# Patient Record
Sex: FEMALE | Race: WHITE
[De-identification: names, ages, dates, MRNs, and addresses within clinical notes are randomized per-mention and may not be internally consistent; named-entity substitution may affect disease eponyms.]

---

## 2017-02-22 ENCOUNTER — HOSPITAL ENCOUNTER (OUTPATIENT)
Dept: HOSPITAL 47 - RADCTMAIN | Age: 32
Discharge: HOME | End: 2017-02-22
Payer: COMMERCIAL

## 2017-02-22 DIAGNOSIS — R06.02: Primary | ICD-10-CM

## 2017-02-22 DIAGNOSIS — R05: ICD-10-CM

## 2017-02-22 PROCEDURE — 71250 CT THORAX DX C-: CPT

## 2017-02-22 NOTE — CT
EXAMINATION TYPE: CT chest wo con

 

DATE OF EXAM: 2/22/2017 12:20 PM

 

COMPARISON: Previous study dated 11/3/2016

 

TECHNIQUE: Helical acquisition through the chest and upper abdomen was obtained without intravenous c
ontrast. The data was reformatted into axial, coronal and sagittal projections.

 

HISTORY: Cough.  SOB

 

CT DLP: 714 mGycm

Automated exposure control for dose reduction was used.

 

FINDINGS: 

There has been a lower cervical fusion.

 

The lungs are clear.

 

There is no significant axillary, internal mammary, mediastinal or hilar adenopathy. There is no pleu
ral or pericardial fluid. The heart is not enlarged.

 

Visualized portions of the upper abdomen are unremarkable.

 

No osseous lesion is seen.

 

IMPRESSION: 

 

NORMAL CT SCAN OF

## 2018-02-01 ENCOUNTER — HOSPITAL ENCOUNTER (OUTPATIENT)
Dept: HOSPITAL 47 - RADCTMAIN | Age: 33
Discharge: HOME | End: 2018-02-01
Payer: MEDICARE

## 2018-02-01 DIAGNOSIS — Z72.0: ICD-10-CM

## 2018-02-01 DIAGNOSIS — R05: ICD-10-CM

## 2018-02-01 DIAGNOSIS — R06.02: ICD-10-CM

## 2018-02-01 DIAGNOSIS — J98.09: Primary | ICD-10-CM

## 2018-02-01 DIAGNOSIS — K44.9: ICD-10-CM

## 2018-02-01 PROCEDURE — 80323 ALKALOIDS NOS: CPT

## 2018-02-01 PROCEDURE — 71250 CT THORAX DX C-: CPT

## 2018-02-01 NOTE — CT
EXAMINATION TYPE: CT chest wo con

 

DATE OF EXAM: 2/1/2018

 

COMPARISON: 2/22/2017

 

HISTORY: 32-year-old female Shortness of Breath

 

TECHNIQUE: Contiguous axial scanning of the chest without IV contrast. Coronal and sagittal reconstru
ctions performed.

 

CT DLP: 609.6 mGycm

Automated exposure control for dose reduction was used.

 

 

FINDINGS: 

Heart is normal size without pericardial effusion.

 

Aorta normal caliber with conventional arch vessel branching anatomy.

 

No thoracic lymphadenopathy seen.

 

Evaluation of the lungs demonstrates very minimal scattered bronchial wall thickening. No consolidati
on or pleural effusion. Minimal stranding inferior lingular atelectasis.

 

A tiny 2 mm peripheral right lower lobe pulmonary nodule axial image 32 is unchanged from 2/22/2017 s
uggesting a benign etiology. 

A 3 mm subpleural pulmonary nodule peripheral left base axial image 46 is similarly unchanged. 

 

Tiny hiatal hernia. Tiny inferior splenule.

 

Bones: No osseous destructive process. Partially visualized intervertebral disc prosthesis lower cerv
ical spine.

 

 

 

IMPRESSION: 

 

1. VERY MINIMAL SCATTERED BRONCHIAL WALL THICKENING COULD REPRESENT BRONCHITIS OR ASTHMA.

2. OTHERWISE, NO SPECIFIC ABNORMALITY SEEN.

3. TINY HIATAL HERNIA.

## 2018-02-12 ENCOUNTER — HOSPITAL ENCOUNTER (OUTPATIENT)
Dept: HOSPITAL 47 - RADECHMAIN | Age: 33
Discharge: HOME | End: 2018-02-12
Payer: COMMERCIAL

## 2018-02-12 DIAGNOSIS — I08.1: Primary | ICD-10-CM

## 2018-02-12 PROCEDURE — 93306 TTE W/DOPPLER COMPLETE: CPT

## 2018-02-13 NOTE — ECHOF
Referral Reason:R06.02 shortness of Breath



MEASUREMENTS

--------

HEIGHT: 170.2 cm

WEIGHT: 102.1 kg

BP: 141/84

RVIDd:   2.8 cm     (< 3.3)

IVSd:   1.2 cm     (0.6 - 1.1)

LVIDd:   4.4 cm     (3.9 - 5.3)

LVPWd:   1.2 cm     (0.6 - 1.1)

IVSs:   1.6 cm

LVIDs:   2.3 cm

LVPWs:   1.6 cm

LAESV Index (A-L):   11.62 ml/m

Ao Diam:   2.7 cm     (2.0 - 3.7)

AV Cusp:   1.7 cm     (1.5 - 2.6)

LA Diam:   3.4 cm     (2.7 - 3.8)

MV EXCURSION:   14.317 mm     (> 18.000)

MV EF SLOPE:   97 mm/s     (70 - 150)

EPSS:   0.6 cm

MV E Dwain:   0.75 m/s

MV DecT:   249 ms

MV A Dwain:   1.05 m/s

MV E/A Ratio:   0.72 

AV maxP.59 mmHg

AV meanP.81 mmHg

RAP:   5.00 mmHg

RVSP:   13.89 mmHg







FINDINGS

--------

Sinus rhythm.

This was a technically adequate study.

The left ventricular size is normal.   There is mild concentric left ventricular hypertrophy.   Overa
ll left ventricular systolic function is normal with, an EF between 55 - 60 %.

The right ventricle is normal in size and function.

Normal LA  size by volume 22+/-6 ml/m2.

The right atrium is normal in size.

The aortic valve is trileaflet, and appears structurally normal. No aortic stenosis or regurgitation.


The mitral valve is normal.   There is trace mitral regurgitation.

Trace tricuspid regurgitation present.   Right ventricular systolic pressure is normal at < 35 mmHg. 
  There is no evidence of pulmonary hypertension.

Trace/mild (physiologic)  pulmonic regurgitation.

The aortic root size is normal.

Normal inferior vena cava with normal inspiratory collapse consistent with estimated right atrial pre
ssure of  5 mmHg.

There is no pericardial effusion.



CONCLUSIONS

--------

1. Sinus rhythm.

2. This was a technically adequate study.

3. The left ventricular size is normal.

4. There is mild concentric left ventricular hypertrophy.

5. Overall left ventricular systolic function is normal with, an EF between 55 - 60 %.

6. Normal LA size by volume 22+/-6 ml/m2.

7. The aortic valve is trileaflet, and appears structurally normal. No aortic stenosis or regurgitati
on.

8. There is trace mitral regurgitation.

9. Trace tricuspid regurgitation present.

10. Right ventricular systolic pressure is normal at < 35 mmHg.

11. Trace/mild (physiologic)  pulmonic regurgitation.

12. The aortic root size is normal.

13. There is no pericardial effusion.





SONOGRAPHER: Pat Ha RDCS

## 2018-06-05 ENCOUNTER — HOSPITAL ENCOUNTER (OUTPATIENT)
Dept: HOSPITAL 47 - RADUSWWP | Age: 33
Discharge: HOME | End: 2018-06-05
Payer: MEDICARE

## 2018-06-05 DIAGNOSIS — R16.0: ICD-10-CM

## 2018-06-05 DIAGNOSIS — K42.9: Primary | ICD-10-CM

## 2018-06-05 PROCEDURE — 76700 US EXAM ABDOM COMPLETE: CPT

## 2018-06-05 NOTE — US
EXAMINATION TYPE: US abdomen complete

 

DATE OF EXAM: 6/5/2018

 

COMPARISON: CT Chest dated February 1, 2018

 

CLINICAL HISTORY: K42.9 Umbilical hernia w/o obstruction. Pt states pain at umbilicus

 

EXAM MEASUREMENTS:

 

Liver Length:  20.7 cm   

Gallbladder Wall:  0.2 cm   

CBD:  0.3 cm

Spleen:  10.9 cm   

Right Kidney:  11.6 X 4.2 X 5.2 cm 

Left Kidney:  12.0 X 4.2 X 5.5 cm   

 

 

 

Pancreas:  wnl, tail obscured by overlying bowel gas

Liver:  Enlarged, difficult to penetrate, heterogeneous  

Gallbladder:  wnl

**Evidence for sonographic Marie's sign:  No

CBD:  wnl 

Spleen:  wnl   

Right Kidney:  wnl   

Left Kidney:  wnl   

Upper IVC:  wnl  

Abd Aorta:  wnl

 

**At umbilicus, area of pt's pain: unable to appreciate abnormality**

The intrahepatic portion of the IVC and proximal abdominal aorta are within normal limits.  There is 
no evidence of cholelithiasis.  Common bile duct is unremarkable.  The visualized portions of the pan
creas are homogenous.  The spleen is unremarkable. Questionable cortical defect in the midpole the le
ft kidney.

 

IMPRESSION: Correlate for hepatocellular disease, hepatic steatosis, there is hepatomegaly. An umbili
peña hernia that contains fat was present on prior CT.

## 2018-06-28 ENCOUNTER — HOSPITAL ENCOUNTER (OUTPATIENT)
Dept: HOSPITAL 47 - ORWHC2ENDO | Age: 33
Discharge: HOME | End: 2018-06-28
Payer: COMMERCIAL

## 2018-06-28 VITALS — HEART RATE: 69 BPM | RESPIRATION RATE: 18 BRPM

## 2018-06-28 VITALS — SYSTOLIC BLOOD PRESSURE: 120 MMHG | DIASTOLIC BLOOD PRESSURE: 86 MMHG

## 2018-06-28 VITALS — TEMPERATURE: 97.7 F

## 2018-06-28 DIAGNOSIS — K29.50: ICD-10-CM

## 2018-06-28 DIAGNOSIS — F39: ICD-10-CM

## 2018-06-28 DIAGNOSIS — J45.909: ICD-10-CM

## 2018-06-28 DIAGNOSIS — Z91.040: ICD-10-CM

## 2018-06-28 DIAGNOSIS — K44.9: ICD-10-CM

## 2018-06-28 DIAGNOSIS — Z79.899: ICD-10-CM

## 2018-06-28 DIAGNOSIS — Z79.51: ICD-10-CM

## 2018-06-28 DIAGNOSIS — Z88.8: ICD-10-CM

## 2018-06-28 DIAGNOSIS — K21.0: Primary | ICD-10-CM

## 2018-06-28 PROCEDURE — 81025 URINE PREGNANCY TEST: CPT

## 2018-06-28 PROCEDURE — 43239 EGD BIOPSY SINGLE/MULTIPLE: CPT

## 2018-06-28 PROCEDURE — 88305 TISSUE EXAM BY PATHOLOGIST: CPT
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
## 2018-06-28 NOTE — P.PCN
Date of Procedure: 06/28/18


Description of Procedure: 





PREOPERATIVE DIAGNOSIS:


Gastroesophageal reflux disease.





POSTOPERATIVE DIAGNOSIS:


Gastritis.


Gastroesophageal reflux disease.


Diaphragmatic hiatal hernia without obstruction.





OPERATION:


Esophagogastroduodenoscopy with biopsies along antrum.





SURGEON: Enma Zhu MD





ANESTHESIA: MAC.





INDICATIONS:


The patient is a 32-year-old female who presents with a history of reflux 

disease. Benefits and risks of the procedure were described. Informed consent 

was obtained.





DESCRIPTION:


The patient was brought into the endoscopy suite and laid in the left lateral 

decubitus position. An Olympus gastroscope was passed along the posterior 

oropharynx down to the distal esophagus where the squamocolumnar junction was 

encountered at 37 cm from the incisors. The stomach was entered and no bile 

reflux was found.  Additional findings are listed below. Biopsies with cold 

forceps were obtained of the antrum. The first through third portion of the 

duodenum was examined and unremarkable. Retroflexion of the scope confirmed  

Hill grade 4 lower esophageal valve. The squamocolumnar junction demostrated LA 

grade A erosive esophagitis. The stomach was desufflated. The patient tolerated 

the procedure well.





FINDINGS:


Squamocolumnar junction 37 cm from the incisors.


Diaphragmatic hiatus at 40 cm.


Hiatal hernia 3 cm.


Hill grade 4 lower esophageal valve.


LA grade A erosive esophagitis.


No active duodenitis.


Early gastritis without bleeding along the antrum





RECOMMENDATIONS:


Upper endoscopy as needed.








Plan - Discharge Summary


New Discharge Prescriptions: 


No Action


   Albuterol Nebulized [Ventolin Nebulized] 2.5 mg INHALATION RT-Q6H PRN


     PRN Reason: Shortness Of Breath


   Colchicine 0.6 mg PO BID


   Albuterol Inhaler [Ventolin Hfa Inhaler] 2 puff INHALATION RT-Q4H PRN


     PRN Reason: Shortness Of Breath


   Diazepam [Valium] 10 mg PO TID PRN


     PRN Reason: Anxiety


   Ibuprofen [Motrin] 800 mg PO TID


   Hydroxychloroquine Sulfate [Plaquenil] 400 mg PO ONCE


Discharge Medication List





Albuterol Inhaler [Ventolin Hfa Inhaler] 2 puff INHALATION RT-Q4H PRN 11/03/16 [

History]


Albuterol Nebulized [Ventolin Nebulized] 2.5 mg INHALATION RT-Q6H PRN 11/03/16 [

History]


Colchicine 0.6 mg PO BID 11/03/16 [History]


Diazepam [Valium] 10 mg PO TID PRN 11/03/16 [History]


Hydroxychloroquine Sulfate [Plaquenil] 400 mg PO ONCE 06/28/18 [History]


Ibuprofen [Motrin] 800 mg PO TID 06/28/18 [History]








Patient Instructions/Handouts:  *[facility] - (Anesthesia) Endoscopy Discharge 

Instructions, Hiatal Hernia (DC), Gastritis (DC), Gastroesophageal Reflux 

Disease (DC), Upper Endoscopy (DC)


Activity/Diet/Wound Care/Special Instructions: 


REST TODAY, NO DRIVING, DRINK LOTS OF FLUIDS


DR TO CALL YOU WHEN DONE LATER TODAY OR TOMORROW


DR'S OFFICE TO CALL WITH RESULTS IN 5-7 DAYS


Discharge Disposition: HOME SELF-CARE

## 2018-08-09 ENCOUNTER — HOSPITAL ENCOUNTER (INPATIENT)
Dept: HOSPITAL 47 - OR | Age: 33
LOS: 3 days | Discharge: HOME | DRG: 208 | End: 2018-08-12
Payer: MEDICARE

## 2018-08-09 VITALS — BODY MASS INDEX: 39.4 KG/M2

## 2018-08-09 DIAGNOSIS — R49.0: ICD-10-CM

## 2018-08-09 DIAGNOSIS — K43.9: ICD-10-CM

## 2018-08-09 DIAGNOSIS — J45.909: ICD-10-CM

## 2018-08-09 DIAGNOSIS — Z88.7: ICD-10-CM

## 2018-08-09 DIAGNOSIS — Z91.040: ICD-10-CM

## 2018-08-09 DIAGNOSIS — M06.9: ICD-10-CM

## 2018-08-09 DIAGNOSIS — Z80.9: ICD-10-CM

## 2018-08-09 DIAGNOSIS — F41.9: ICD-10-CM

## 2018-08-09 DIAGNOSIS — R50.9: ICD-10-CM

## 2018-08-09 DIAGNOSIS — T88.4XXA: ICD-10-CM

## 2018-08-09 DIAGNOSIS — Z88.8: ICD-10-CM

## 2018-08-09 DIAGNOSIS — J98.01: ICD-10-CM

## 2018-08-09 DIAGNOSIS — Z79.899: ICD-10-CM

## 2018-08-09 DIAGNOSIS — Z82.49: ICD-10-CM

## 2018-08-09 DIAGNOSIS — Z53.9: ICD-10-CM

## 2018-08-09 DIAGNOSIS — E66.01: ICD-10-CM

## 2018-08-09 DIAGNOSIS — G89.29: ICD-10-CM

## 2018-08-09 DIAGNOSIS — Z83.49: ICD-10-CM

## 2018-08-09 DIAGNOSIS — R00.1: ICD-10-CM

## 2018-08-09 DIAGNOSIS — R13.10: ICD-10-CM

## 2018-08-09 DIAGNOSIS — R51: ICD-10-CM

## 2018-08-09 DIAGNOSIS — Z82.61: ICD-10-CM

## 2018-08-09 DIAGNOSIS — J96.01: Primary | ICD-10-CM

## 2018-08-09 DIAGNOSIS — F32.9: ICD-10-CM

## 2018-08-09 DIAGNOSIS — R41.3: ICD-10-CM

## 2018-08-09 DIAGNOSIS — M54.2: ICD-10-CM

## 2018-08-09 DIAGNOSIS — Z82.5: ICD-10-CM

## 2018-08-09 DIAGNOSIS — G93.1: ICD-10-CM

## 2018-08-09 LAB
ANION GAP SERPL CALC-SCNC: 9 MMOL/L
BASOPHILS # BLD AUTO: 0 K/UL (ref 0–0.2)
BASOPHILS NFR BLD AUTO: 0 %
BUN SERPL-SCNC: 11 MG/DL (ref 7–17)
CALCIUM SPEC-MCNC: 8.6 MG/DL (ref 8.4–10.2)
CHLORIDE SERPL-SCNC: 107 MMOL/L (ref 98–107)
CO2 BLDA-SCNC: 25 MMOL/L (ref 19–24)
CO2 SERPL-SCNC: 23 MMOL/L (ref 22–30)
EOSINOPHIL # BLD AUTO: 0.1 K/UL (ref 0–0.7)
EOSINOPHIL NFR BLD AUTO: 1 %
ERYTHROCYTE [DISTWIDTH] IN BLOOD BY AUTOMATED COUNT: 4.84 M/UL (ref 3.8–5.4)
ERYTHROCYTE [DISTWIDTH] IN BLOOD: 14.1 % (ref 11.5–15.5)
GLUCOSE BLD-MCNC: 167 MG/DL (ref 75–99)
GLUCOSE SERPL-MCNC: 182 MG/DL (ref 74–99)
HCO3 BLDA-SCNC: 23 MMOL/L (ref 21–25)
HCT VFR BLD AUTO: 42 % (ref 34–46)
HGB BLD-MCNC: 13.8 GM/DL (ref 11.4–16)
LYMPHOCYTES # SPEC AUTO: 1.9 K/UL (ref 1–4.8)
LYMPHOCYTES NFR SPEC AUTO: 12 %
MAGNESIUM SPEC-SCNC: 2 MG/DL (ref 1.6–2.3)
MCH RBC QN AUTO: 28.4 PG (ref 25–35)
MCHC RBC AUTO-ENTMCNC: 32.8 G/DL (ref 31–37)
MCV RBC AUTO: 86.7 FL (ref 80–100)
MONOCYTES # BLD AUTO: 0.2 K/UL (ref 0–1)
MONOCYTES NFR BLD AUTO: 1 %
NEUTROPHILS # BLD AUTO: 13.6 K/UL (ref 1.3–7.7)
NEUTROPHILS NFR BLD AUTO: 86 %
PCO2 BLDA: 46 MMHG (ref 35–45)
PH BLDA: 7.31 [PH] (ref 7.35–7.45)
PLATELET # BLD AUTO: 323 K/UL (ref 150–450)
PO2 BLDA: 385 MMHG (ref 83–108)
POTASSIUM SERPL-SCNC: 4.4 MMOL/L (ref 3.5–5.1)
SODIUM SERPL-SCNC: 139 MMOL/L (ref 137–145)
WBC # BLD AUTO: 15.9 K/UL (ref 3.8–10.6)

## 2018-08-09 PROCEDURE — 0BH17EZ INSERTION OF ENDOTRACHEAL AIRWAY INTO TRACHEA, VIA NATURAL OR ARTIFICIAL OPENING: ICD-10-PCS

## 2018-08-09 PROCEDURE — 81001 URINALYSIS AUTO W/SCOPE: CPT

## 2018-08-09 PROCEDURE — 85025 COMPLETE CBC W/AUTO DIFF WBC: CPT

## 2018-08-09 PROCEDURE — 86900 BLOOD TYPING SEROLOGIC ABO: CPT

## 2018-08-09 PROCEDURE — 86850 RBC ANTIBODY SCREEN: CPT

## 2018-08-09 PROCEDURE — 70551 MRI BRAIN STEM W/O DYE: CPT

## 2018-08-09 PROCEDURE — 82805 BLOOD GASES W/O2 SATURATION: CPT

## 2018-08-09 PROCEDURE — 36600 WITHDRAWAL OF ARTERIAL BLOOD: CPT

## 2018-08-09 PROCEDURE — 84100 ASSAY OF PHOSPHORUS: CPT

## 2018-08-09 PROCEDURE — 95816 EEG AWAKE AND DROWSY: CPT

## 2018-08-09 PROCEDURE — 87205 SMEAR GRAM STAIN: CPT

## 2018-08-09 PROCEDURE — 94640 AIRWAY INHALATION TREATMENT: CPT

## 2018-08-09 PROCEDURE — 83735 ASSAY OF MAGNESIUM: CPT

## 2018-08-09 PROCEDURE — 70450 CT HEAD/BRAIN W/O DYE: CPT

## 2018-08-09 PROCEDURE — 87070 CULTURE OTHR SPECIMN AEROBIC: CPT

## 2018-08-09 PROCEDURE — 36415 COLL VENOUS BLD VENIPUNCTURE: CPT

## 2018-08-09 PROCEDURE — 71045 X-RAY EXAM CHEST 1 VIEW: CPT

## 2018-08-09 PROCEDURE — 94003 VENT MGMT INPAT SUBQ DAY: CPT

## 2018-08-09 PROCEDURE — 86901 BLOOD TYPING SEROLOGIC RH(D): CPT

## 2018-08-09 PROCEDURE — 80048 BASIC METABOLIC PNL TOTAL CA: CPT

## 2018-08-09 PROCEDURE — 5A1935Z RESPIRATORY VENTILATION, LESS THAN 24 CONSECUTIVE HOURS: ICD-10-PCS

## 2018-08-09 RX ADMIN — MIDAZOLAM ONE MG: 1 INJECTION INTRAMUSCULAR; INTRAVENOUS at 14:25

## 2018-08-09 RX ADMIN — MIDAZOLAM PRN MG: 1 INJECTION INTRAMUSCULAR; INTRAVENOUS at 15:16

## 2018-08-09 RX ADMIN — MIDAZOLAM ONE MG: 1 INJECTION INTRAMUSCULAR; INTRAVENOUS at 14:07

## 2018-08-09 RX ADMIN — PROPOFOL SCH MLS/HR: 10 INJECTION, EMULSION INTRAVENOUS at 21:06

## 2018-08-09 RX ADMIN — METHYLPREDNISOLONE SODIUM SUCCINATE SCH MG: 125 INJECTION, POWDER, FOR SOLUTION INTRAMUSCULAR; INTRAVENOUS at 21:07

## 2018-08-09 RX ADMIN — IPRATROPIUM BROMIDE AND ALBUTEROL SULFATE SCH ML: .5; 3 SOLUTION RESPIRATORY (INHALATION) at 20:55

## 2018-08-09 RX ADMIN — PROPOFOL SCH MLS/HR: 10 INJECTION, EMULSION INTRAVENOUS at 18:00

## 2018-08-09 RX ADMIN — CEFAZOLIN SCH: 330 INJECTION, POWDER, FOR SOLUTION INTRAMUSCULAR; INTRAVENOUS at 17:35

## 2018-08-09 RX ADMIN — BUDESONIDE SCH MG: 1 SUSPENSION RESPIRATORY (INHALATION) at 20:55

## 2018-08-09 RX ADMIN — CEFAZOLIN SCH MLS/HR: 330 INJECTION, POWDER, FOR SOLUTION INTRAMUSCULAR; INTRAVENOUS at 21:09

## 2018-08-09 RX ADMIN — IPRATROPIUM BROMIDE AND ALBUTEROL SULFATE SCH ML: .5; 3 SOLUTION RESPIRATORY (INHALATION) at 23:44

## 2018-08-09 RX ADMIN — MIDAZOLAM PRN MG: 1 INJECTION INTRAMUSCULAR; INTRAVENOUS at 14:44

## 2018-08-09 RX ADMIN — FAMOTIDINE SCH MG: 10 INJECTION, SOLUTION INTRAVENOUS at 21:08

## 2018-08-09 RX ADMIN — MIDAZOLAM ONE MG: 1 INJECTION INTRAMUSCULAR; INTRAVENOUS at 14:03

## 2018-08-09 RX ADMIN — MIDAZOLAM ONE MG: 1 INJECTION INTRAMUSCULAR; INTRAVENOUS at 14:28

## 2018-08-09 RX ADMIN — IPRATROPIUM BROMIDE AND ALBUTEROL SULFATE SCH ML: .5; 3 SOLUTION RESPIRATORY (INHALATION) at 15:47

## 2018-08-09 RX ADMIN — PROPOFOL SCH: 10 INJECTION, EMULSION INTRAVENOUS at 17:35

## 2018-08-09 NOTE — XR
EXAMINATION TYPE: XR chest 1V

 

DATE OF EXAM: 8/9/2018

 

COMPARISON: 2/1/2018

 

HISTORY: Bronchospasm

 

TECHNIQUE: Single frontal view of the chest is obtained.

 

FINDINGS: Endotracheal tube extends into the right mainstem bronchus and should be retracted approxim
ately 3 to 3.5 cm for optimal placement. There are overall low lung volumes and right hemidiaphragm e
levation. Retrocardiac opacity is seen favored to represent atelectasis given the right mainstem bron
chial intubation. Enteric tube is appropriately placed with its fenestrated portion beyond the gastro
esophageal junction. Osseous structures are grossly intact.

 

IMPRESSION:  

1. Right mainstem bronchial intubation with recommendation of retraction of the endotracheal tube 3 t
o 3.5 cm and reimaging.

2. Retrocardiac opacity likely represents atelectasis given the above findings.

 

A Yellow level critical message alert has been initiated for Enma Zhu MD via the [employer] | Critical Results System on 8/9/2018 2:52 PM.  This message alert has been sent to Enma cornell MD via the preferences provided by the clinician for the receipt of Radiology Critical Findin
gs. Message ID [id number].

## 2018-08-09 NOTE — P.GSHP
History of Present Illness


H&P Date: 08/09/18








CHIEF COMPLAINT: Ventral hernia.





HISTORY OF PRESENT ILLNESS: The patient is a 32-year-old female who


presents with a history of swelling along the epigastrium above the umbilicus.


Findings were consistent with ventral hernia after diagnostic studies.


Now she presents for further evaluation and management.





PAST MEDICAL HISTORY: 


Please see list.





PAST SURGICAL HISTORY: 


Please see list.





MEDICATIONS: 


Please see list.





ALLERGIES:  Please see list. 





SOCIAL HISTORY: No illicit drug use





FAMILY HISTORY: No reports of Crohn disease or ulcerative colitis. 





REVIEW OF ORGAN SYSTEMS: 


CONSTITUTIONAL: No reports of fevers or chills. 


GI:  Denies any blood in stools or constipation. 





PHYSICAL EXAM: 


VITAL SIGNS:  Stable


GENERAL: Well-developed pleasant female in no acute distress. 


HEENT: No scleral icterus. Extraocular movements grossly


intact. Moist buccal mucosa. 


NECK: Supple without lymphadenopathy. 


CHEST: Unlabored respirations. Equal bilateral excursions. 


CARDIOVASCULAR: Regular rate and rhythm. Distal 2+ pulses. 


ABDOMEN: Soft, nondistended.  Protuberant.


MUSCULOSKELETAL: No clubbing, cyanosis, or edema. 





STUDIES:


Ultrasound of the abdomen consistent with incarcerated ventral hernia/umbilical 

hernia





ASSESSMENT: 


1.  Ventral hernia.


2.  Morbid obesity, BMI 35.6





PLAN: 


1.  Recommend proceeding with robotic ventral hernia repair.  Patient requests 

avoidance of mesh.


2.  Benefits and risks of surgical intervention was discussed including 

possibility of open technique.


3.  DVT prophylaxis.


4.  Antibiotic prophylaxis.





Past Medical History


Past Medical History: Asthma, Pneumonia, Rheumatoid Arthritis (RA)


Additional Past Medical History / Comment(s): chronic neck pain and headaches, 

being treated for PERICARDITIS, LUPUS like symptoms


History of Any Multi-Drug Resistant Organisms: None Reported


Past Surgical History: Ear Surgery, Tonsillectomy


Additional Past Surgical History / Comment(s): titanium plate neck sx, cosmetic 

sx as child on ears


Past Anesthesia/Blood Transfusion Reactions: No Reported Reaction


Smoking Status: Never smoker





- Past Family History


  ** Mother


Family Medical History: No Reported History





  ** Father


Family Medical History: Cancer


Additional Family Medical History / Comment(s): throat





Medications and Allergies


 Home Medications











 Medication  Instructions  Recorded  Confirmed  Type


 


Albuterol Inhaler [Ventolin Hfa 2 puff INHALATION RT-Q4H PRN 11/03/16 07/30/18 

History





Inhaler]    


 


Albuterol Nebulized [Ventolin 2.5 mg INHALATION RT-Q6H PRN 11/03/16 07/30/18 

History





Nebulized]    


 


Colchicine 0.6 mg PO BID 11/03/16 07/30/18 History


 


Hydroxychloroquine Sulfate 400 mg PO HS 06/28/18 07/30/18 History





[Plaquenil]    


 


Ibuprofen [Motrin] 800 mg PO TID 06/28/18 07/30/18 History


 


Omeprazole 20 mg PO DAILY #30 capsule. 06/28/18 07/30/18 Rx











 Allergies











Allergy/AdvReac Type Severity Reaction Status Date / Time


 


hepatitis B virus vaccine Allergy  Itching/swe Verified 07/30/18 15:18





   lling  


 


latex Allergy  Rash/Hives Verified 07/30/18 14:48


 


duloxetine [From Cymbalta] AdvReac  Hearing Verified 07/30/18 14:48





   Loss

## 2018-08-09 NOTE — CT
EXAMINATION TYPE: CT brain wo con

 

DATE OF EXAM: 8/9/2018

 

COMPARISON: 11/4/2014

 

HISTORY: Unresponsive

 

CT DLP: mGycm.  Automated Exposure Control for Dose Reduction was Utilized.

 

 

TECHNIQUE: CT scan of the head is performed without contrast.

 

Ventricles of normal size. There is no mass effect nor midline shift. There is no sign of intracrania
l hemorrhage. The calvarium is intact.

 

IMPRESSION:

Negative CT scan of the brain. No change.

## 2018-08-09 NOTE — P.CNPUL
History of Present Illness


Consult date: 08/09/18


Reason for consult: asthma


Chief complaint: Acute respiratory failure


History of present illness: 





32-year-old female patient with a difficult post intubation course.  The 

patient came in to undergo a ventral hernia repair.  The patient was induced by 

anesthesia.  The patient was given a combination of other milligrams of 

lidocaine, 50 mg of rocuronium, 2 mg of Versed and 50 g of fentanyl for 

induction.  Following that, the patient was intubated.  Immediately 

postintubation, and it was very difficult to bag.  There is to the volumes were 

low.  The patient progressively become hypoxic.  There was a drop in the pulse 

ox and there was no and bilateral CO2.  The patient was extremely 

bronchospastic and wheezy.  It was difficult to bag.  The patient was given 

epinephrine IV without much improvement in the airway pressures.  A total of 

100 g of epinephrine was given.  Subsequently the patient became bradycardic.  

Heart rate dropped in the mid 50s.  She was given a dose of atropine 1 mg.  She 

continued to drop her saturations on the day low 40s.  A fiberoptic scope was 

used to confirm due to positioning.  It was difficult to pass the ET tube to 

visualized airways.  The procedure was aborted.  Subsequently, the heart rate 

and the blood pressure stabilized and the tube positioning was confirmed again.

  The shins phosphorus came up in the mid 90s.  She was transferred to 

recovery.  I was asked to evaluate this patient in the recovery room





At the time of arrival at around 4:10 PM, the patient was sedated with Diprivan 

and she was very calm and comfortable.  She had received a total of 8 mg of 

Versed and 2 mg of Ativan.  She was successful the mechanical ventilator.  She 

is currently on assist control mode at the rate of 16, tidal volume of 450, 

FiO2 of 50% and PEEP of 5.  Her blood gases was done and patient showed a pH of 

7.31 with a pCO2 of 86 and pO2 of 385 and this was done and FiO2 of 100%.  Stat 

chest x-ray was ordered and the patient's ET tube was seen in the right 

mainstem bronchus.  The tube was pulled back by 2 cm and currently she is on 22 

cm lip line.  Rest on that equal and bilateral.  Peak airway pressure is 21.  

The patient is having equal and symmetrical breath sounds.  Minimal expiratory 

wheezing is noted.  She is hemodynamically stable.  Heart rate is sinus at the 

rate of 105.  Pulse ox is 95% on above-mentioned vent setting with an FiO2 of 40

%.  Obviously procedure was aborted.  No surgery was done.  A Dueñas catheter 

was inserted and the urine output was in order of 30 mL he had she is also 

receiving IV fluids and currently she is on 65 mL of lactated Ringer.  The 

preop blood work was all within normal and the patient had a normal renal 

function.  Patient is known to have bronchial asthma.  She also has history of 

lupus.  She has had previous history of pericarditis.  She has been maintained 

on O2 scene and Plaquenil on outpatient basis.  She is also Ventolin estimate 

basis.  No reported or established history of obstructive sleep apnea.  Note 

that the patient had a normal and uneventful intubation and the cords were 

seen.  The direct laryngoscopy.  A CAT scan of the chest that was done on 02/01/ 2018 showed no acute abnormalities identified and hiatal hernia.  No 

parenchymal abnormalities.  No mediastinal lymphadenopathy.  The mediastinal 

masses.





Review of Systems


ROS unobtainable: due to endotracheal tube





Past Medical History


Past Medical History: Asthma, Pneumonia, Rheumatoid Arthritis (RA)


Additional Past Medical History / Comment(s): Lupus, RA, pericarditis, chronic 

neck pain, chronic headaches,


History of Any Multi-Drug Resistant Organisms: None Reported


Past Surgical History: Ear Surgery, Tonsillectomy


Additional Past Surgical History / Comment(s): titanium plate neck sx, cosmetic 

sx as child on ears


Past Anesthesia/Blood Transfusion Reactions: No Reported Reaction


Smoking Status: Never smoker





- Past Family History


  ** Mother


Family Medical History: No Reported History





  ** Father


Family Medical History: Cancer


Additional Family Medical History / Comment(s): throat





Medications and Allergies


 Home Medications











 Medication  Instructions  Recorded  Confirmed  Type


 


Albuterol Inhaler [Ventolin Hfa 2 puff INHALATION RT-Q4H PRN 11/03/16 08/09/18 

History





Inhaler]    


 


Albuterol Nebulized [Ventolin 2.5 mg INHALATION RT-Q6H PRN 11/03/16 08/09/18 

History





Nebulized]    


 


Colchicine 0.6 mg PO BID 11/03/16 08/09/18 History


 


Hydroxychloroquine Sulfate 400 mg PO HS 06/28/18 08/09/18 History





[Plaquenil]    


 


Ibuprofen [Motrin] 600 mg PO TID 06/28/18 08/09/18 History


 


Omeprazole 20 mg PO DAILY 08/09/18 08/09/18 History











 Allergies











Allergy/AdvReac Type Severity Reaction Status Date / Time


 


hepatitis B virus vaccine Allergy  Itching/swe Verified 08/09/18 16:12





   lling  


 


latex Allergy  Rash/Hives Verified 08/09/18 16:12


 


duloxetine [From Cymbalta] AdvReac  Hearing Verified 08/09/18 16:12





   Loss  














Physical Exam


Vitals: 


 Vital Signs











  Temp Pulse Pulse Pulse Resp BP Pulse Ox


 


 08/09/18 15:59   109 H    26 H  


 


 08/09/18 15:56    123 H   16  131/72  96


 


 08/09/18 15:47   102 H    25 H  


 


 08/09/18 15:30    110 H   16  132/68  99


 


 08/09/18 15:00    116 H   16  135/61  97


 


 08/09/18 14:45    117 H   16  142/91  99


 


 08/09/18 14:30    104 H   16  117/57  99


 


 08/09/18 14:15    118 H   16  129/62  99


 


 08/09/18 14:00    100   16  107/63  94 L


 


 08/09/18 13:51  97.8 F   90   14  124/63  92 L


 


 08/09/18 11:12  98.3 F    74  16  116/79  97








 Intake and Output











 08/09/18 08/09/18 08/09/18





 06:59 14:59 22:59


 


Intake Total  200 


 


Balance  200 


 


Intake:   


 


  IV  200 














Intubated, comfortable on mechanical ventilator the patient is 7.5 tube in 

place.


Head exam was generally normal. There was no scleral icterus or corneal arcus. 

Mucous membranes were moist.


Neck was supple and without jugular venous distension, thyromegaly, or carotid 

bruits. Carotids were easily palpable bilaterally. There was no adenopathy.


Lungs other records symmetrical bilaterally along with some few scattered 

external wheeze


Cardiac exam revealed the PMI to be normally situated and sized. The rhythm was 

regular and no extrasystoles were noted during several minutes of auscultation. 

The first and second heart sounds were normal and physiologic splitting of the 

second heart sound was noted. There were no murmurs, rubs, clicks, or gallops.


Abdominal exam revealed normal bowel sounds. The abdomen was soft, non-tender, 

and without masses, organomegaly, or appreciable enlargement of the abdominal 

aorta.


Examination of the extremities revealed easily palpable radial, femoral and 

pedal pulses. There was no cyanosis, clubbing or edema.


Examination of the skin revealed no evidence of significant rashes, suspicious 

appearing nevi or other concerning lesions.


Neurologically the patient is sedated and the patient is currently in the 

process of being taken up to sedation.





Results





- Laboratory Findings


CBC and BMP: 


 08/09/18 14:08





 08/09/18 14:08


ABG











ABG pH  7.31  (7.35-7.45)  L  08/09/18  14:40    


 


ABG pCO2  46 mmHg (35-45)  H  08/09/18  14:40    


 


ABG pO2  385 mmHg ()  H  08/09/18  14:40    


 


ABG O2 Saturation  100.0 % (94-97)  H  08/09/18  14:40    








Abnormal lab findings: 


 Abnormal Labs











  08/09/18 08/09/18 08/09/18





  14:08 14:08 14:40


 


WBC  15.9 H  


 


Neutrophils #  13.6 H  


 


ABG pH    7.31 L


 


ABG pCO2    46 H


 


ABG pO2    385 H


 


ABG Total CO2    25 H


 


ABG O2 Saturation    100.0 H


 


Glucose   182 H 














- Diagnostic Findings


Chest x-ray: image reviewed





Assessment and Plan


Plan: 








Assessment





1 acute hypoxic respiratory failure.  The patient had difficulties with airway 

management and pulmonary management including oxygenation and ventilation post 

intubation.  Events reported by anesthesia was noted.  It's likely that the 

patient had a right mainstem intubation knowing that the chest x-rays was done 

in recovery show that the ET tube was in the right mainstem.  The tube is 

positioned appropriately at this point in time.  The ventilation is adequate.  

Oxidation is adequate.  The airway pressures are low.  Another possibility is 

that the patient had acute bronchospasm there was severe and it affected 

oxygenation and ventilation in general.  The patient received epinephrine.  No 

indication for a negative pressure pulmonary edema.  N0 indication for any 

airway obstruction at this point in time.  ET tube is in place.  I was able to 

pass and the suction tube without any major difficulties.  Breath sounds are 

equal and symmetrical.





2 acute hemodynamic instability secondary to hypoxemia, recovered.





3 obesity





4.   ventral hernia and the procedure was canceled due to the events mentioned 

above





5 history of lupus/RA





6 history of pericarditis





Plan





We'll put the patient on DuoNeb neb regiments of the clock.  IV Solu-Medrol 125 

mg now and 60 mg every 6 hours.  Pulmicort Respules twice a day.  Blood gases 

was reviewed.  ET tube positioning was done.  Wean off sedation.  Assessment 

patient.  Assessment weaning parameters.  Spontaneous breathing trial.  

Possible extubation today.  The patient will moved to the intensive care unit.  

Continue to follow.

## 2018-08-09 NOTE — P.PN
Progress Note - Text


Progress Note Date: 08/09/18





Immediately upon induction and intubation patient went into acute bronchospasm.

  Anesthesia team at bedside.  As result of acute bronchospasm prior to prep 

and drape for surgery, case is canceled.  Patient to be observed in the ICU.

## 2018-08-09 NOTE — P.PN
Progress Note - Text


Progress Note Date: 08/09/18





Updates to family performed including acute bronchospasm.





Discussion with anesthesia demonstrated tracheal stricture identified despite 

attempts of bronchoscopy.  Per patient's family request, ENT Dr. Leary 

requested.

## 2018-08-10 LAB
ANION GAP SERPL CALC-SCNC: 7 MMOL/L
BASOPHILS # BLD AUTO: 0 K/UL (ref 0–0.2)
BASOPHILS NFR BLD AUTO: 0 %
BUN SERPL-SCNC: 15 MG/DL (ref 7–17)
CALCIUM SPEC-MCNC: 8.8 MG/DL (ref 8.4–10.2)
CHLORIDE SERPL-SCNC: 106 MMOL/L (ref 98–107)
CO2 BLDA-SCNC: 26 MMOL/L (ref 19–24)
CO2 SERPL-SCNC: 25 MMOL/L (ref 22–30)
EOSINOPHIL # BLD AUTO: 0.1 K/UL (ref 0–0.7)
EOSINOPHIL NFR BLD AUTO: 1 %
ERYTHROCYTE [DISTWIDTH] IN BLOOD BY AUTOMATED COUNT: 4.69 M/UL (ref 3.8–5.4)
ERYTHROCYTE [DISTWIDTH] IN BLOOD: 14.3 % (ref 11.5–15.5)
GLUCOSE BLD-MCNC: 141 MG/DL (ref 75–99)
GLUCOSE BLD-MCNC: 151 MG/DL (ref 75–99)
GLUCOSE BLD-MCNC: 158 MG/DL (ref 75–99)
GLUCOSE BLD-MCNC: 188 MG/DL (ref 75–99)
GLUCOSE SERPL-MCNC: 193 MG/DL (ref 74–99)
HCO3 BLDA-SCNC: 25 MMOL/L (ref 21–25)
HCT VFR BLD AUTO: 40.5 % (ref 34–46)
HGB BLD-MCNC: 13.2 GM/DL (ref 11.4–16)
LYMPHOCYTES # SPEC AUTO: 1.1 K/UL (ref 1–4.8)
LYMPHOCYTES NFR SPEC AUTO: 6 %
MAGNESIUM SPEC-SCNC: 1.9 MG/DL (ref 1.6–2.3)
MCH RBC QN AUTO: 28.2 PG (ref 25–35)
MCHC RBC AUTO-ENTMCNC: 32.7 G/DL (ref 31–37)
MCV RBC AUTO: 86.3 FL (ref 80–100)
MONOCYTES # BLD AUTO: 0.3 K/UL (ref 0–1)
MONOCYTES NFR BLD AUTO: 2 %
NEUTROPHILS # BLD AUTO: 16.8 K/UL (ref 1.3–7.7)
NEUTROPHILS NFR BLD AUTO: 92 %
PCO2 BLDA: 40 MMHG (ref 35–45)
PH BLDA: 7.41 [PH] (ref 7.35–7.45)
PH UR: 6 [PH] (ref 5–8)
PLATELET # BLD AUTO: 317 K/UL (ref 150–450)
PO2 BLDA: 91 MMHG (ref 83–108)
POTASSIUM SERPL-SCNC: 4.2 MMOL/L (ref 3.5–5.1)
PROT UR QL: (no result)
RBC UR QL: >182 /HPF (ref 0–5)
SODIUM SERPL-SCNC: 138 MMOL/L (ref 137–145)
SP GR UR: 1.02 (ref 1–1.03)
SQUAMOUS UR QL AUTO: 1 /HPF (ref 0–4)
UROBILINOGEN UR QL STRIP: <2 MG/DL (ref ?–2)
WBC # BLD AUTO: 18.3 K/UL (ref 3.8–10.6)
WBC #/AREA URNS HPF: 44 /HPF (ref 0–5)

## 2018-08-10 RX ADMIN — PROPOFOL SCH MLS/HR: 10 INJECTION, EMULSION INTRAVENOUS at 01:00

## 2018-08-10 RX ADMIN — IPRATROPIUM BROMIDE AND ALBUTEROL SULFATE SCH ML: .5; 3 SOLUTION RESPIRATORY (INHALATION) at 20:17

## 2018-08-10 RX ADMIN — ENOXAPARIN SODIUM SCH MG: 40 INJECTION SUBCUTANEOUS at 09:36

## 2018-08-10 RX ADMIN — PROPOFOL SCH MLS/HR: 10 INJECTION, EMULSION INTRAVENOUS at 05:41

## 2018-08-10 RX ADMIN — METHYLPREDNISOLONE SODIUM SUCCINATE SCH MG: 125 INJECTION, POWDER, FOR SOLUTION INTRAMUSCULAR; INTRAVENOUS at 23:03

## 2018-08-10 RX ADMIN — METHYLPREDNISOLONE SODIUM SUCCINATE SCH MG: 125 INJECTION, POWDER, FOR SOLUTION INTRAMUSCULAR; INTRAVENOUS at 01:04

## 2018-08-10 RX ADMIN — FAMOTIDINE SCH MG: 10 INJECTION, SOLUTION INTRAVENOUS at 09:37

## 2018-08-10 RX ADMIN — IPRATROPIUM BROMIDE AND ALBUTEROL SULFATE SCH ML: .5; 3 SOLUTION RESPIRATORY (INHALATION) at 02:58

## 2018-08-10 RX ADMIN — IPRATROPIUM BROMIDE AND ALBUTEROL SULFATE SCH ML: .5; 3 SOLUTION RESPIRATORY (INHALATION) at 07:55

## 2018-08-10 RX ADMIN — METHYLPREDNISOLONE SODIUM SUCCINATE SCH MG: 125 INJECTION, POWDER, FOR SOLUTION INTRAMUSCULAR; INTRAVENOUS at 05:41

## 2018-08-10 RX ADMIN — CEFTRIAXONE SODIUM SCH MG: 1 INJECTION, POWDER, FOR SOLUTION INTRAMUSCULAR; INTRAVENOUS at 08:06

## 2018-08-10 RX ADMIN — CEFAZOLIN SCH MLS/HR: 330 INJECTION, POWDER, FOR SOLUTION INTRAMUSCULAR; INTRAVENOUS at 12:45

## 2018-08-10 RX ADMIN — IPRATROPIUM BROMIDE AND ALBUTEROL SULFATE SCH ML: .5; 3 SOLUTION RESPIRATORY (INHALATION) at 23:46

## 2018-08-10 RX ADMIN — MAGNESIUM SULFATE IN DEXTROSE SCH MLS/HR: 10 INJECTION, SOLUTION INTRAVENOUS at 09:37

## 2018-08-10 RX ADMIN — METHYLPREDNISOLONE SODIUM SUCCINATE SCH MG: 125 INJECTION, POWDER, FOR SOLUTION INTRAMUSCULAR; INTRAVENOUS at 18:46

## 2018-08-10 RX ADMIN — BUDESONIDE SCH MG: 1 SUSPENSION RESPIRATORY (INHALATION) at 07:55

## 2018-08-10 RX ADMIN — INSULIN ASPART SCH: 100 INJECTION, SOLUTION INTRAVENOUS; SUBCUTANEOUS at 20:06

## 2018-08-10 RX ADMIN — CEFAZOLIN SCH MLS/HR: 330 INJECTION, POWDER, FOR SOLUTION INTRAMUSCULAR; INTRAVENOUS at 20:18

## 2018-08-10 RX ADMIN — METHYLPREDNISOLONE SODIUM SUCCINATE SCH MG: 125 INJECTION, POWDER, FOR SOLUTION INTRAMUSCULAR; INTRAVENOUS at 12:38

## 2018-08-10 RX ADMIN — PROPOFOL SCH MLS/HR: 10 INJECTION, EMULSION INTRAVENOUS at 03:28

## 2018-08-10 RX ADMIN — IPRATROPIUM BROMIDE AND ALBUTEROL SULFATE SCH ML: .5; 3 SOLUTION RESPIRATORY (INHALATION) at 15:55

## 2018-08-10 RX ADMIN — FAMOTIDINE SCH MG: 10 INJECTION, SOLUTION INTRAVENOUS at 20:17

## 2018-08-10 RX ADMIN — BUDESONIDE SCH MG: 1 SUSPENSION RESPIRATORY (INHALATION) at 20:17

## 2018-08-10 RX ADMIN — CEFAZOLIN SCH MLS/HR: 330 INJECTION, POWDER, FOR SOLUTION INTRAMUSCULAR; INTRAVENOUS at 23:03

## 2018-08-10 RX ADMIN — MAGNESIUM SULFATE IN DEXTROSE SCH MLS/HR: 10 INJECTION, SOLUTION INTRAVENOUS at 08:07

## 2018-08-10 RX ADMIN — IPRATROPIUM BROMIDE AND ALBUTEROL SULFATE SCH ML: .5; 3 SOLUTION RESPIRATORY (INHALATION) at 11:50

## 2018-08-10 RX ADMIN — INSULIN ASPART SCH UNIT: 100 INJECTION, SOLUTION INTRAVENOUS; SUBCUTANEOUS at 06:54

## 2018-08-10 NOTE — P.PN
Subjective


Progress Note Date: 08/10/18








32-year-old female patient with a difficult post intubation course.  The 

patient came in to undergo a ventral hernia repair.  The patient was induced by 

anesthesia.  The patient was given a combination of other milligrams of 

lidocaine, 50 mg of rocuronium, 2 mg of Versed and 50 g of fentanyl for 

induction.  Following that, the patient was intubated.  Immediately 

postintubation, and it was very difficult to bag.  There is to the volumes were 

low.  The patient progressively become hypoxic.  There was a drop in the pulse 

ox and there was no and bilateral CO2.  The patient was extremely 

bronchospastic and wheezy.  It was difficult to bag.  The patient was given 

epinephrine IV without much improvement in the airway pressures.  A total of 

100 g of epinephrine was given.  Subsequently the patient became bradycardic.  

Heart rate dropped in the mid 50s.  She was given a dose of atropine 1 mg.  She 

continued to drop her saturations on the day low 40s.  A fiberoptic scope was 

used to confirm due to positioning.  It was difficult to pass the ET tube to 

visualized airways.  The procedure was aborted.  Subsequently, the heart rate 

and the blood pressure stabilized and the tube positioning was confirmed again.

  The shins phosphorus came up in the mid 90s.  She was transferred to 

recovery.  I was asked to evaluate this patient in the recovery room





At the time of arrival at around 4:10 PM, the patient was sedated with Diprivan 

and she was very calm and comfortable.  She had received a total of 8 mg of 

Versed and 2 mg of Ativan.  She was successful the mechanical ventilator.  She 

is currently on assist control mode at the rate of 16, tidal volume of 450, 

FiO2 of 50% and PEEP of 5.  Her blood gases was done and patient showed a pH of 

7.31 with a pCO2 of 86 and pO2 of 385 and this was done and FiO2 of 100%.  Stat 

chest x-ray was ordered and the patient's ET tube was seen in the right 

mainstem bronchus.  The tube was pulled back by 2 cm and currently she is on 22 

cm lip line.  Rest on that equal and bilateral.  Peak airway pressure is 21.  

The patient is having equal and symmetrical breath sounds.  Minimal expiratory 

wheezing is noted.  She is hemodynamically stable.  Heart rate is sinus at the 

rate of 105.  Pulse ox is 95% on above-mentioned vent setting with an FiO2 of 40

%.  Obviously procedure was aborted.  No surgery was done.  A Dueñas catheter 

was inserted and the urine output was in order of 30 mL he had she is also 

receiving IV fluids and currently she is on 65 mL of lactated Ringer.  The 

preop blood work was all within normal and the patient had a normal renal 

function.  Patient is known to have bronchial asthma.  She also has history of 

lupus.  She has had previous history of pericarditis.  She has been maintained 

on O2 scene and Plaquenil on outpatient basis.  She is also Ventolin estimate 

basis.  No reported or established history of obstructive sleep apnea.  Note 

that the patient had a normal and uneventful intubation and the cords were 

seen.  The direct laryngoscopy.  A CAT scan of the chest that was done on 02/01/ 2018 showed no acute abnormalities identified and hiatal hernia.  No 

parenchymal abnormalities.  No mediastinal lymphadenopathy.  The mediastinal 

masses.





On 08/10/2018 I'm seeing this patient for a follow-up.  The patient is 

currently in the intensive care unit.  However efforts to wean the patient off 

the mechanical ventilator failed yesterday.  As the patient was being weaned 

off the Diprivan, she was getting agitated and she never got to a point where 

she was able to follow commands or answer questions.  I suspected that there 

was some residual drug effect disturbing our weaning process.  The patient was 

not following any commands.  She was thrashing.  She was getting agitated.  As 

such, we decided to keep the patient on sedation.  She got moved to the 

intensive care unit.  Overnight she was kept on a mechanical ventilator and 

this morning she is on a combination of Diprivan and fentanyl for sedation.  

Diprivan earlier this morning was at 50 g per KG per minute.  The patient is 

also on no dose of fentanyl drip pH is very calm and comfortable.  She is 

synchronous with the mechanical ventilator and currently she is on assist 

control of 16, tidal volume of 450, FiO2 of 40% and a PEEP of 5.  The blood 

gases from this morning showed a pH of 7.41 with a pCO2 of 40.  PO2 of 91.  The 

peak airway pressure is 27.  As mentioned earlier the patient intubated by 7.5 

ET tube.  No difficulties suctioning her through the orotracheal tube.  Able to 

advanced to suction through the orotracheal tube without any major 

difficulties.  The chest x-ray from today shows no acute abnormalities.  Note 

that the patient overnight was having a low-grade fever with a temperature max 

of 100.9.  UA was abnormal and the patient was started on IV Rocephin.  She is 

hemodynamically stable.  She is producing adequate amount of urine output.  The 

white cell count this morning is at 18.3.  Rest of the blood work and 

electrodes are all within normal limits.  No major bronchospasm and wheezing.  

She is on bronchodilators.  She is on IV Solu-Medrol.  She is on Pulmicort 

Respules.  She is on DVT and GI prophylaxis.  CAT scan of the brain was done 

yesterday and it showed no acute abnormalities.





Objective





- Vital Signs


Vital signs: 


 Vital Signs











Temp  98.6 F   08/10/18 05:23


 


Pulse  96   08/10/18 07:58


 


Resp  16   08/10/18 07:00


 


BP  119/55   08/10/18 07:00


 


Pulse Ox  96   08/10/18 07:00








 Intake & Output











 08/09/18 08/10/18 08/10/18





 18:59 06:59 18:59


 


Intake Total 700 1559.150 66.907


 


Output Total 1500 1240 


 


Balance -800 319.150 66.907


 


Weight  115.5 kg 


 


Intake:   


 


   1200 


 


    Sodium Chloride 0.9% 1,  1200 





    000 ml @ 100 mls/hr IV .   





    Q10H VIKAS Rx#:[id number]   


 


  Intake, IV Titration  359.150 66.907





  Amount   


 


    Empty Bag 1 bag @ 10 MCG/  340.350 66.907





    KG/MIN 6.17 mls/hr IV .   





    [id number] VIKAS with Propofol   





    1,000 mg Rx#:[id number]   


 


    fentaNYL (PF) 2,500 mcg  18.8 





    In Sodium Chloride 0.9%   





    200 ml @ Per Protocol IV   





    .Q0M VIKAS Rx#:[id number]   


 


Output:   


 


  Urine 1500 1240 


 


Other:   


 


  Voiding Method Indwelling Catheter Indwelling Catheter 














- Exam








Intubated, comfortable on mechanical ventilator the patient is 7.5 tube in 

place.


Head exam was generally normal. There was no scleral icterus or corneal arcus. 

Mucous membranes were moist.


Neck was supple and without jugular venous distension, thyromegaly, or carotid 

bruits. Carotids were easily palpable bilaterally. There was no adenopathy.


Lungs other records symmetrical bilaterally along with some few scattered 

external wheeze


Cardiac exam revealed the PMI to be normally situated and sized. The rhythm was 

regular and no extrasystoles were noted during several minutes of auscultation. 

The first and second heart sounds were normal and physiologic splitting of the 

second heart sound was noted. There were no murmurs, rubs, clicks, or gallops.


Abdominal exam revealed normal bowel sounds. The abdomen was soft, non-tender, 

and without masses, organomegaly, or appreciable enlargement of the abdominal 

aorta.


Examination of the extremities revealed easily palpable radial, femoral and 

pedal pulses. There was no cyanosis, clubbing or edema.


Examination of the skin revealed no evidence of significant rashes, suspicious 

appearing nevi or other concerning lesions.


Neurologically the patient is sedated and the patient is currently in the 

process of being taken up to sedation again this morning.  The patient will be 

given a sedation holiday.  Her neurologic function will be assessed.  

Accordingly we'll decide on the weaning.  Pupils are equal and reactive to 

light.  No nystagmus.  No facial asymmetry.  She has a good cough and gag 

reflex.








- Labs


CBC & Chem 7: 


 08/10/18 04:36





 08/10/18 04:36


Labs: 


 Abnormal Lab Results - Last 24 Hours (Table)











  08/09/18 08/09/18 08/09/18 Range/Units





  14:08 14:08 14:40 


 


WBC  15.9 H    (3.8-10.6)  k/uL


 


Neutrophils #  13.6 H    (1.3-7.7)  k/uL


 


ABG pH    7.31 L  (7.35-7.45)  


 


ABG pCO2    46 H  (35-45)  mmHg


 


ABG pO2    385 H  ()  mmHg


 


ABG Total CO2    25 H  (19-24)  mmol/L


 


ABG O2 Saturation    100.0 H  (94-97)  %


 


Glucose   182 H   (74-99)  mg/dL


 


POC Glucose (mg/dL)     (75-99)  mg/dL


 


Urine Appearance     (Clear)  


 


Urine Protein     (Negative)  


 


Urine Ketones     (Negative)  


 


Urine Blood     (Negative)  


 


Ur Leukocyte Esterase     (Negative)  


 


Urine RBC     (0-5)  /hpf


 


Urine WBC     (0-5)  /hpf


 


Urine Mucus     (None)  /hpf














  08/09/18 08/10/18 08/10/18 Range/Units





  18:03 04:36 04:36 


 


WBC   18.3 H   (3.8-10.6)  k/uL


 


Neutrophils #   16.8 H   (1.3-7.7)  k/uL


 


ABG pH     (7.35-7.45)  


 


ABG pCO2     (35-45)  mmHg


 


ABG pO2     ()  mmHg


 


ABG Total CO2     (19-24)  mmol/L


 


ABG O2 Saturation     (94-97)  %


 


Glucose    193 H  (74-99)  mg/dL


 


POC Glucose (mg/dL)  167 H    (75-99)  mg/dL


 


Urine Appearance     (Clear)  


 


Urine Protein     (Negative)  


 


Urine Ketones     (Negative)  


 


Urine Blood     (Negative)  


 


Ur Leukocyte Esterase     (Negative)  


 


Urine RBC     (0-5)  /hpf


 


Urine WBC     (0-5)  /hpf


 


Urine Mucus     (None)  /hpf














  08/10/18 08/10/18 08/10/18 Range/Units





  05:20 06:20 06:30 


 


WBC     (3.8-10.6)  k/uL


 


Neutrophils #     (1.3-7.7)  k/uL


 


ABG pH     (7.35-7.45)  


 


ABG pCO2     (35-45)  mmHg


 


ABG pO2     ()  mmHg


 


ABG Total CO2  26 H    (19-24)  mmol/L


 


ABG O2 Saturation  97.7 H    (94-97)  %


 


Glucose     (74-99)  mg/dL


 


POC Glucose (mg/dL)   188 H   (75-99)  mg/dL


 


Urine Appearance    Cloudy H  (Clear)  


 


Urine Protein    1+ H  (Negative)  


 


Urine Ketones    Trace H  (Negative)  


 


Urine Blood    Large H  (Negative)  


 


Ur Leukocyte Esterase    Trace H  (Negative)  


 


Urine RBC    >182 H  (0-5)  /hpf


 


Urine WBC    44 H  (0-5)  /hpf


 


Urine Mucus    Rare H  (None)  /hpf








 Microbiology - Last 24 Hours (Table)











 08/09/18 20:22 Sputum Culture - Preliminary





 Sputum 














Assessment and Plan


Plan: 








Assessment





1 acute hypoxic respiratory failure.  The patient had difficulties with airway 

management and pulmonary management including oxygenation and ventilation post 

intubation.  Events reported by anesthesia was noted.  It's likely that the 

patient had a right mainstem intubation knowing that the chest x-rays was done 

in recovery show that the ET tube was in the right mainstem.  The tube is 

positioned appropriately at this point in time.  The ventilation is adequate.  

Oxidation is adequate.  The airway pressures are low.  Another possibility is 

that the patient had acute bronchospasm there was severe and it affected 

oxygenation and ventilation in general.  The patient received epinephrine.  No 

indication for a negative pressure pulmonary edema.  N0 indication for any 

airway obstruction at this point in time.  ET tube is in place.  I was able to 

pass and the suction tube without any major difficulties.  Breath sounds are 

equal and symmetrical.





2 acute hemodynamic instability secondary to hypoxemia, recovered.





3 obesity





4 ventral hernia and the procedure was canceled due to the events mentioned 

above





5 history of lupus/RA





6 history of pericarditis





Plan





I was unable to wean this patient off the mechanical ventilator for the reasons 

mentioned above.  The predominant problem was her mentation and inability to 

wake up from sedation and follow commands and be appropriate and proceed with 

weaning parameters and weaning protocols.  The patient was becoming agitated 

off sedation and she was having an autonomic reaction including hypertension 

and tachycardia.  She was also becoming tachypneic.  Based on that the weaning 

process was discontinued and there was deferred for this morning.  This morning

, the patient will be given again a sedation holiday and would proceed 

accordingly.  Note that overnight the patient had a CAT scan of the brain that 

showed no acute abnormalities.  Based on the events that occurred in the 

operating room and based on the documentation and based on my discussion with 

the anesthesiologist, there is no reason to suspect hypoxic brain injury  at 

this point in time.  On today's evaluation, there is no significant 

bronchospasm wheezing.  The patient and accommodation bronchodilators and 

steroids.  There is a concern for urine checked infection and for that reason 

the patient was started on IV Rocephin.  She is having a low-grade fever.  

Further recommendations are to follow based on her overall progress.  The 

patient is currently in the intensive care unit.  Family will be updated on her 

condition.  There is a critically care evaluation.  35 minutes.


Time with Patient: Greater than 30

## 2018-08-10 NOTE — XR
EXAMINATION TYPE: XR chest 1V

 

DATE OF EXAM: 8/10/2018

 

COMPARISON: 8/9/2018

 

HISTORY: 32-year-old female bronchospasm

 

TECHNIQUE: Single frontal view of the chest is obtained.

 

FINDINGS:  

ET tube has been pulled back now 2.2 cm from the pawel. NG tube courses below the diaphragm. Heart n
ormal size. Left basilar retrocardiac opacity persists with possible trace left effusion.

 

 

 

IMPRESSION:  

1. ET tube pulled back with tip now 2.2 cm from the pawel.

2. Persistent patchy left basilar and retrocardiac atelectasis or infiltrate.

## 2018-08-10 NOTE — P.PN
Subjective


Progress Note Date: 08/10/18





Patient seen and evaluated.  Family and friends at bedside.  Patient had acute 

bronchospasm immediately upon induction during her surgery hence her umbilical 

hernia surgery was canceled.  She is sitting at bedside.  She has been 

extubated.  Per discussion with the care team, she's been more confused.  She 

is unaware of the day, time, and location where she is presently.  She does 

remember her children.  At this time she has short-term memory impairment.





Objective





- Vital Signs


Vital signs: 


 Vital Signs











Temp  97.9 F   08/10/18 12:00


 


Pulse  88   08/10/18 12:04


 


Resp  17   08/10/18 12:00


 


BP  105/55   08/10/18 12:00


 


Pulse Ox  97   08/10/18 12:00








 Intake & Output











 08/09/18 08/10/18 08/10/18





 18:59 06:59 18:59


 


Intake Total 700 1559.150 772.311


 


Output Total 1500 1240 625


 


Balance -800 319.150 147.311


 


Weight  115.5 kg 


 


Intake:   


 


   1200 500


 


    Sodium Chloride 0.9% 1,  1200 500





    000 ml @ 100 mls/hr IV .   





    Q10H VIKAS Rx#:[id number]   


 


  Intake, IV Titration  359.150 272.311





  Amount   


 


    Empty Bag 1 bag @ 10 MCG/  340.350 72.311





    KG/MIN 6.17 mls/hr IV .   





    [id number] VIKAS with Propofol   





    1,000 mg Rx#:[id number]   


 


    Magnesium Sulfate-D5w Pmx   200





    1 gm In Dextrose/Water 1   





    100ml.bag @ 100 mls/hr   





    IVPB Q1H [facility] Rx#:   





    [id number]   


 


    fentaNYL (PF) 2,500 mcg  18.8 





    In Sodium Chloride 0.9%   





    200 ml @ Per Protocol IV   





    .Q0M [facility]AS Rx#:[id number]   


 


Output:   


 


  Urine 1500 1240 625


 


Other:   


 


  Voiding Method Indwelling Catheter Indwelling Catheter Bedside Commode














- Exam





GENERAL:  Well developed and in no acute distress. Pleasant.


HEENT:  No sclera icterus. Extraocular movements grossly intact.  Moist buccal 

mucosa. Head is atraumatic, normocephalic. Hears conversational speech. No 

nasal drainage.


NECK:  Supple without lymphadenopathy. No JV distention.


CHEST:  Non-labored respirations and equal bilateral excursions. 


CARDIOVASCULAR:  Regular rate and rhythm.  Palpable 2+ radial pulses.


ABDOMEN:  Soft, nontender.  Nondistended.


MUSCULOSKELETAL:  No clubbing, cyanosis or edema.


NEUROLOGIC:  No focal or lateralizing signs. 


PSYCH:  Appropriate affect.  Alert to self.


SKIN: Good skin turgor.  Well perfused.








- Labs


CBC & Chem 7: 


 08/10/18 04:36





 08/10/18 04:36


Labs: 


 Abnormal Lab Results - Last 24 Hours (Table)











  08/09/18 08/10/18 08/10/18 Range/Units





  18:03 04:36 04:36 


 


WBC   18.3 H   (3.8-10.6)  k/uL


 


Neutrophils #   16.8 H   (1.3-7.7)  k/uL


 


ABG Total CO2     (19-24)  mmol/L


 


ABG O2 Saturation     (94-97)  %


 


Glucose    193 H  (74-99)  mg/dL


 


POC Glucose (mg/dL)  167 H    (75-99)  mg/dL


 


Urine Appearance     (Clear)  


 


Urine Protein     (Negative)  


 


Urine Ketones     (Negative)  


 


Urine Blood     (Negative)  


 


Ur Leukocyte Esterase     (Negative)  


 


Urine RBC     (0-5)  /hpf


 


Urine WBC     (0-5)  /hpf


 


Urine Mucus     (None)  /hpf














  08/10/18 08/10/18 08/10/18 Range/Units





  05:20 06:20 06:30 


 


WBC     (3.8-10.6)  k/uL


 


Neutrophils #     (1.3-7.7)  k/uL


 


ABG Total CO2  26 H    (19-24)  mmol/L


 


ABG O2 Saturation  97.7 H    (94-97)  %


 


Glucose     (74-99)  mg/dL


 


POC Glucose (mg/dL)   188 H   (75-99)  mg/dL


 


Urine Appearance    Cloudy H  (Clear)  


 


Urine Protein    1+ H  (Negative)  


 


Urine Ketones    Trace H  (Negative)  


 


Urine Blood    Large H  (Negative)  


 


Ur Leukocyte Esterase    Trace H  (Negative)  


 


Urine RBC    >182 H  (0-5)  /hpf


 


Urine WBC    44 H  (0-5)  /hpf


 


Urine Mucus    Rare H  (None)  /hpf














  08/10/18 Range/Units





  12:42 


 


WBC   (3.8-10.6)  k/uL


 


Neutrophils #   (1.3-7.7)  k/uL


 


ABG Total CO2   (19-24)  mmol/L


 


ABG O2 Saturation   (94-97)  %


 


Glucose   (74-99)  mg/dL


 


POC Glucose (mg/dL)  141 H  (75-99)  mg/dL


 


Urine Appearance   (Clear)  


 


Urine Protein   (Negative)  


 


Urine Ketones   (Negative)  


 


Urine Blood   (Negative)  


 


Ur Leukocyte Esterase   (Negative)  


 


Urine RBC   (0-5)  /hpf


 


Urine WBC   (0-5)  /hpf


 


Urine Mucus   (None)  /hpf








 Microbiology - Last 24 Hours (Table)











 08/09/18 20:22 Gram Stain - Preliminary





 Sputum Sputum Culture - Preliminary














- Imaging and Cardiology


Chest x-ray: report reviewed, image reviewed


CT Scan - head: report reviewed, image reviewed (Images reviewed without acute 

event identified)





Assessment and Plan


(1) Ventral hernia without obstruction or gangrene


Current Visit: Yes   Status: Acute   Code(s): K43.9 - VENTRAL HERNIA WITHOUT 

OBSTRUCTION OR GANGRENE   SNOMED Code(s): 965951684


   





(2) Acute bronchospasm


Current Visit: Yes   Status: Acute   Code(s): J98.01 - ACUTE BRONCHOSPASM   

SNOMED Code(s): 30791206475360


   





(3) Asthma


Current Visit: Yes   Status: Acute   Code(s): J45.909 - UNSPECIFIED ASTHMA, 

UNCOMPLICATED   SNOMED Code(s): 374064796


   





(4) Acute respiratory failure


Current Visit: Yes   Status: Acute   Code(s): J96.00 - ACUTE RESPIRATORY FAILURE

, UNSP W HYPOXIA OR HYPERCAPNIA   SNOMED Code(s): 58796281


   





(5) Acute memory impairment


Current Visit: Yes   Status: Acute   Code(s): R41.3 - OTHER AMNESIA   SNOMED 

Code(s): 650283931


   


Plan: 





1.  Acute bronchospasm, management per pulmonary.


2.  Patient is currently undergoing cognitive evaluation.


3.  Discharge when medically stable.





Critical care time 32 minutes

## 2018-08-11 LAB
ANION GAP SERPL CALC-SCNC: 6 MMOL/L
BASOPHILS # BLD AUTO: 0 K/UL (ref 0–0.2)
BASOPHILS NFR BLD AUTO: 0 %
BUN SERPL-SCNC: 16 MG/DL (ref 7–17)
CALCIUM SPEC-MCNC: 8.5 MG/DL (ref 8.4–10.2)
CHLORIDE SERPL-SCNC: 110 MMOL/L (ref 98–107)
CO2 SERPL-SCNC: 25 MMOL/L (ref 22–30)
EOSINOPHIL # BLD AUTO: 0 K/UL (ref 0–0.7)
EOSINOPHIL NFR BLD AUTO: 0 %
ERYTHROCYTE [DISTWIDTH] IN BLOOD BY AUTOMATED COUNT: 4.19 M/UL (ref 3.8–5.4)
ERYTHROCYTE [DISTWIDTH] IN BLOOD: 14.4 % (ref 11.5–15.5)
GLUCOSE BLD-MCNC: 129 MG/DL (ref 75–99)
GLUCOSE BLD-MCNC: 130 MG/DL (ref 75–99)
GLUCOSE BLD-MCNC: 149 MG/DL (ref 75–99)
GLUCOSE SERPL-MCNC: 156 MG/DL (ref 74–99)
HCT VFR BLD AUTO: 36.9 % (ref 34–46)
HGB BLD-MCNC: 11.8 GM/DL (ref 11.4–16)
LYMPHOCYTES # SPEC AUTO: 1 K/UL (ref 1–4.8)
LYMPHOCYTES NFR SPEC AUTO: 7 %
MAGNESIUM SPEC-SCNC: 2.5 MG/DL (ref 1.6–2.3)
MCH RBC QN AUTO: 28.2 PG (ref 25–35)
MCHC RBC AUTO-ENTMCNC: 32.1 G/DL (ref 31–37)
MCV RBC AUTO: 87.9 FL (ref 80–100)
MONOCYTES # BLD AUTO: 0.3 K/UL (ref 0–1)
MONOCYTES NFR BLD AUTO: 2 %
NEUTROPHILS # BLD AUTO: 13 K/UL (ref 1.3–7.7)
NEUTROPHILS NFR BLD AUTO: 91 %
PLATELET # BLD AUTO: 268 K/UL (ref 150–450)
POTASSIUM SERPL-SCNC: 4.7 MMOL/L (ref 3.5–5.1)
SODIUM SERPL-SCNC: 141 MMOL/L (ref 137–145)
WBC # BLD AUTO: 14.3 K/UL (ref 3.8–10.6)

## 2018-08-11 RX ADMIN — COLCHICINE SCH MG: 0.6 TABLET, FILM COATED ORAL at 21:21

## 2018-08-11 RX ADMIN — IPRATROPIUM BROMIDE AND ALBUTEROL SULFATE SCH ML: .5; 3 SOLUTION RESPIRATORY (INHALATION) at 08:57

## 2018-08-11 RX ADMIN — IPRATROPIUM BROMIDE AND ALBUTEROL SULFATE SCH ML: .5; 3 SOLUTION RESPIRATORY (INHALATION) at 03:38

## 2018-08-11 RX ADMIN — INSULIN ASPART SCH: 100 INJECTION, SOLUTION INTRAVENOUS; SUBCUTANEOUS at 17:35

## 2018-08-11 RX ADMIN — FAMOTIDINE SCH MG: 10 INJECTION, SOLUTION INTRAVENOUS at 08:57

## 2018-08-11 RX ADMIN — INSULIN ASPART SCH: 100 INJECTION, SOLUTION INTRAVENOUS; SUBCUTANEOUS at 06:39

## 2018-08-11 RX ADMIN — IPRATROPIUM BROMIDE AND ALBUTEROL SULFATE SCH ML: .5; 3 SOLUTION RESPIRATORY (INHALATION) at 16:02

## 2018-08-11 RX ADMIN — CEFTRIAXONE SODIUM SCH MG: 1 INJECTION, POWDER, FOR SOLUTION INTRAMUSCULAR; INTRAVENOUS at 08:57

## 2018-08-11 RX ADMIN — INSULIN ASPART SCH: 100 INJECTION, SOLUTION INTRAVENOUS; SUBCUTANEOUS at 12:40

## 2018-08-11 RX ADMIN — FAMOTIDINE SCH MG: 10 INJECTION, SOLUTION INTRAVENOUS at 21:21

## 2018-08-11 RX ADMIN — METHYLPREDNISOLONE SODIUM SUCCINATE SCH MG: 125 INJECTION, POWDER, FOR SOLUTION INTRAMUSCULAR; INTRAVENOUS at 06:40

## 2018-08-11 RX ADMIN — ENOXAPARIN SODIUM SCH MG: 40 INJECTION SUBCUTANEOUS at 08:57

## 2018-08-11 RX ADMIN — IPRATROPIUM BROMIDE AND ALBUTEROL SULFATE SCH: .5; 3 SOLUTION RESPIRATORY (INHALATION) at 20:21

## 2018-08-11 RX ADMIN — INSULIN ASPART SCH: 100 INJECTION, SOLUTION INTRAVENOUS; SUBCUTANEOUS at 21:59

## 2018-08-11 RX ADMIN — BUDESONIDE SCH MG: 1 SUSPENSION RESPIRATORY (INHALATION) at 08:57

## 2018-08-11 RX ADMIN — INSULIN ASPART SCH: 100 INJECTION, SOLUTION INTRAVENOUS; SUBCUTANEOUS at 01:38

## 2018-08-11 RX ADMIN — IPRATROPIUM BROMIDE AND ALBUTEROL SULFATE SCH ML: .5; 3 SOLUTION RESPIRATORY (INHALATION) at 12:20

## 2018-08-11 RX ADMIN — CEFAZOLIN SCH MLS/HR: 330 INJECTION, POWDER, FOR SOLUTION INTRAMUSCULAR; INTRAVENOUS at 15:46

## 2018-08-11 RX ADMIN — BUDESONIDE SCH: 1 SUSPENSION RESPIRATORY (INHALATION) at 20:21

## 2018-08-11 NOTE — P.PN
Subjective


Progress Note Date: 08/11/18








32-year-old female patient with a difficult post intubation course.  The 

patient came in to undergo a ventral hernia repair.  The patient was induced by 

anesthesia.  The patient was given a combination of other milligrams of 

lidocaine, 50 mg of rocuronium, 2 mg of Versed and 50 g of fentanyl for 

induction.  Following that, the patient was intubated.  Immediately 

postintubation, and it was very difficult to bag.  There is to the volumes were 

low.  The patient progressively become hypoxic.  There was a drop in the pulse 

ox and there was no and bilateral CO2.  The patient was extremely 

bronchospastic and wheezy.  It was difficult to bag.  The patient was given 

epinephrine IV without much improvement in the airway pressures.  A total of 

100 g of epinephrine was given.  Subsequently the patient became bradycardic.  

Heart rate dropped in the mid 50s.  She was given a dose of atropine 1 mg.  She 

continued to drop her saturations on the day low 40s.  A fiberoptic scope was 

used to confirm due to positioning.  It was difficult to pass the ET tube to 

visualized airways.  The procedure was aborted.  Subsequently, the heart rate 

and the blood pressure stabilized and the tube positioning was confirmed again.

  The shins phosphorus came up in the mid 90s.  She was transferred to 

recovery.  I was asked to evaluate this patient in the recovery room





At the time of arrival at around 4:10 PM, the patient was sedated with Diprivan 

and she was very calm and comfortable.  She had received a total of 8 mg of 

Versed and 2 mg of Ativan.  She was successful the mechanical ventilator.  She 

is currently on assist control mode at the rate of 16, tidal volume of 450, 

FiO2 of 50% and PEEP of 5.  Her blood gases was done and patient showed a pH of 

7.31 with a pCO2 of 86 and pO2 of 385 and this was done and FiO2 of 100%.  Stat 

chest x-ray was ordered and the patient's ET tube was seen in the right 

mainstem bronchus.  The tube was pulled back by 2 cm and currently she is on 22 

cm lip line.  Rest on that equal and bilateral.  Peak airway pressure is 21.  

The patient is having equal and symmetrical breath sounds.  Minimal expiratory 

wheezing is noted.  She is hemodynamically stable.  Heart rate is sinus at the 

rate of 105.  Pulse ox is 95% on above-mentioned vent setting with an FiO2 of 40

%.  Obviously procedure was aborted.  No surgery was done.  A Dueñas catheter 

was inserted and the urine output was in order of 30 mL he had she is also 

receiving IV fluids and currently she is on 65 mL of lactated Ringer.  The 

preop blood work was all within normal and the patient had a normal renal 

function.  Patient is known to have bronchial asthma.  She also has history of 

lupus.  She has had previous history of pericarditis.  She has been maintained 

on O2 scene and Plaquenil on outpatient basis.  She is also Ventolin estimate 

basis.  No reported or established history of obstructive sleep apnea.  Note 

that the patient had a normal and uneventful intubation and the cords were 

seen.  The direct laryngoscopy.  A CAT scan of the chest that was done on 02/01/ 2018 showed no acute abnormalities identified and hiatal hernia.  No 

parenchymal abnormalities.  No mediastinal lymphadenopathy.  The mediastinal 

masses.





On 08/10/2018 I'm seeing this patient for a follow-up.  The patient is 

currently in the intensive care unit.  However efforts to wean the patient off 

the mechanical ventilator failed yesterday.  As the patient was being weaned 

off the Diprivan, she was getting agitated and she never got to a point where 

she was able to follow commands or answer questions.  I suspected that there 

was some residual drug effect disturbing our weaning process.  The patient was 

not following any commands.  She was thrashing.  She was getting agitated.  As 

such, we decided to keep the patient on sedation.  She got moved to the 

intensive care unit.  Overnight she was kept on a mechanical ventilator and 

this morning she is on a combination of Diprivan and fentanyl for sedation.  

Diprivan earlier this morning was at 50 g per KG per minute.  The patient is 

also on no dose of fentanyl drip pH is very calm and comfortable.  She is 

synchronous with the mechanical ventilator and currently she is on assist 

control of 16, tidal volume of 450, FiO2 of 40% and a PEEP of 5.  The blood 

gases from this morning showed a pH of 7.41 with a pCO2 of 40.  PO2 of 91.  The 

peak airway pressure is 27.  As mentioned earlier the patient intubated by 7.5 

ET tube.  No difficulties suctioning her through the orotracheal tube.  Able to 

advanced to suction through the orotracheal tube without any major 

difficulties.  The chest x-ray from today shows no acute abnormalities.  Note 

that the patient overnight was having a low-grade fever with a temperature max 

of 100.9.  UA was abnormal and the patient was started on IV Rocephin.  She is 

hemodynamically stable.  She is producing adequate amount of urine output.  The 

white cell count this morning is at 18.3.  Rest of the blood work and 

electrodes are all within normal limits.  No major bronchospasm and wheezing.  

She is on bronchodilators.  She is on IV Solu-Medrol.  She is on Pulmicort 

Respules.  She is on DVT and GI prophylaxis.  CAT scan of the brain was done 

yesterday and it showed no acute abnormalities.





On 08/11/2018, the patient remains extubated without having any major 

respiratory difficulties.  Nevertheless the mother some neurologic deficits.  

The patient is having some memory issues.  She cannot remember events or short-

term events.  At other times she becomes disoriented.  No headaches.  No neck 

stiffness.  No focal neurological deficits.  No agitation.  There is episodes 

of brief confusion and for that reason a CAT scan of the brain was done and it 

was negative and a neurology consultation was also requested.  As far the 

swallowing, there is improving.  The patient does not have any throat pain.  

She was having initially some difficulties in swallowing and this gradually 

improved and currently we are advancing her to soft.  She is afebrile.  No 

respiratory difficulties for now.  She is on empiric antibiotic coverage with 

IV Rocephin.  She is on Pulmicort Respules.  She is on DuoNeb nebulized 

treatment around-the-clock.





Objective





- Vital Signs


Vital signs: 


 Vital Signs











Temp  97.3 F L  08/11/18 08:00


 


Pulse  83   08/11/18 11:00


 


Resp  26 H  08/11/18 11:00


 


BP  115/64   08/11/18 09:00


 


Pulse Ox  98   08/11/18 09:00








 Intake & Output











 08/10/18 08/11/18 08/11/18





 18:59 06:59 18:59


 


Intake Total 2422.804 1340 400


 


Output Total 925 980 0


 


Balance 447.311 220 400


 


Weight  112.7 kg 112.7 kg


 


Intake:   


 


  IV 1100 1200 400


 


    Sodium Chloride 0.9% 1, 1100 1200 400





    000 ml @ 100 mls/hr IV .   





    Q10H [facility] Rx#:[id number]   


 


  Intake, IV Titration 272.311  





  Amount   


 


    Empty Bag 1 bag @ 10 MCG/ 72.311  





    KG/MIN 6.17 mls/hr IV .   





    [id number] [facility]AS with Propofol   





    1,000 mg Rx#:[id number]   


 


    Magnesium Sulfate-D5w Pmx 200  





    1 gm In Dextrose/Water 1   





    100ml.bag @ 100 mls/hr   





    IVPB Q1H [facility] Rx#:   





    [id number]   


 


Output:   


 


  Urine 925 980 0


 


Other:   


 


  Voiding Method Bedside Commode Bedside Commode 














- Exam








Gen. appearance, comfortable likely distress resting comfortably in bed.


Head exam was generally normal. There was no scleral icterus or corneal arcus. 

Mucous membranes were moist.


Neck was supple and without jugular venous distension, thyromegaly, or carotid 

bruits. Carotids were easily palpable bilaterally. There was no adenopathy.


Lungs other records symmetrical bilaterally


Cardiac exam revealed the PMI to be normally situated and sized. The rhythm was 

regular and no extrasystoles were noted during several minutes of auscultation. 

The first and second heart sounds were normal and physiologic splitting of the 

second heart sound was noted. There were no murmurs, rubs, clicks, or gallops.


Abdominal exam revealed normal bowel sounds. The abdomen was soft, non-tender, 

and without masses, organomegaly, or appreciable enlargement of the abdominal 

aorta.


Examination of the extremities revealed easily palpable radial, femoral and 

pedal pulses. There was no cyanosis, clubbing or edema.


Examination of the skin revealed no evidence of significant rashes, suspicious 

appearing nevi or other concerning lesions.


Neurologically the patient is having episodes of confusion and memory loss.  

This is essentially short-term memory loss.  No focal neurological deficits.  

Neurologic exam is nonfocal.





- Labs


CBC & Chem 7: 


 08/11/18 04:23





 08/11/18 04:23


Labs: 


 Abnormal Lab Results - Last 24 Hours (Table)











  08/10/18 08/10/18 08/10/18 Range/Units





  12:42 18:24 23:07 


 


WBC     (3.8-10.6)  k/uL


 


Neutrophils #     (1.3-7.7)  k/uL


 


Chloride     ()  mmol/L


 


Glucose     (74-99)  mg/dL


 


POC Glucose (mg/dL)  141 H  151 H  158 H  (75-99)  mg/dL


 


Magnesium     (1.6-2.3)  mg/dL














  08/11/18 08/11/18 08/11/18 Range/Units





  04:23 04:23 06:31 


 


WBC  14.3 H    (3.8-10.6)  k/uL


 


Neutrophils #  13.0 H    (1.3-7.7)  k/uL


 


Chloride   110 H   ()  mmol/L


 


Glucose   156 H   (74-99)  mg/dL


 


POC Glucose (mg/dL)    149 H  (75-99)  mg/dL


 


Magnesium   2.5 H   (1.6-2.3)  mg/dL








 Microbiology - Last 24 Hours (Table)











 08/09/18 20:22 Gram Stain - Final





 Sputum Sputum Culture - Final














Assessment and Plan


Plan: 








Assessment





1 acute hypoxic respiratory failure.  The patient had difficulties with airway 

management and pulmonary management including oxygenation and ventilation post 

intubation.  Events reported by anesthesia was noted.  The patient was 

extubated without any major difficulties and currently she is on nasal cannula 

at 2 L/m.  No stridor.  Consider right mainstem intubation.  Consider 

anaphylaxis or an ALLERGIC reaction to latex.





2 acute hemodynamic instability secondary to hypoxemia, recovered.





3 obesity





4 ventral hernia and the procedure was canceled due to the events mentioned 

above





5 history of lupus/RA





6 history of pericarditis





7 altered mentation, confusion with possibility of an acute hypoxic 

encephalopathy.





Plan








Advance diet as tolerated.  Resume her home medication.  Neurologic 

consultation regarding her ongoing confusion and altered mentation.

## 2018-08-11 NOTE — XR
EXAMINATION TYPE: XR chest 1V

 

DATE OF EXAM: 8/11/2018

 

HISTORY: bronchospasm.

 

REFERENCE: Previous study dated 8/10/2018.

 

FINDINGS: The patient has been extubated. The patient's NG tube has been removed.

 

There is minimal atelectasis at the left lung base. There is blunting of left CP angle and I cannot e
xclude a small effusion. Heart size is upper limits of normal.

 

IMPRESSION: 

1. MINIMAL ATELECTASIS, LEFT LUNG BASE.

2. SMALL LEFT EFFUSION.

## 2018-08-11 NOTE — P.CNNES
History of Present Illness


Consult date: 08/11/18


Reason for Consult: Patient with recent confusion and anoxic encehalopathy.


History of Present Illness: 





This patient is a 32-year-old right-handed white female who was admitted to 

[facility] on 08/09/2018 for elective ventral hernia surgery.  

On the date of her surgical procedure the patient had a difficult intubation by 

anesthesia.  She became very sedated with the intubation and became hypoxic.  

There was a drop in her pulse oximetry and she became extremely bronchospastic 

with wheezing.  She required bagging and was given epinephrine IV without much 

improvement in her airway.  Her heart rate had dropped into the mid 50s.  She 

was given a dose of atropine 1 mg.  She continued to show drop in her oxygen 

saturations into the low 40s.  It was difficult to pass ET tube as per the 

notes of pulmonary medicine.  The procedure was aborted and the patient was 

stabilized and she was intubated.  She was placed initially on Diprivan and 

this was slowly weaned.  Patient was extubated 2 days ago and was sent for a 

computed tomography scan of the brain as she shows some signs of cognitive 

impairment following the procedure.  The patient underwent computed tomography 

scan of the brain on 08/09/2018 which came back negative for any acute changes.

  Since her procedure the patient still is showing signs of memory impairment 

and amnesia.  She denies any significant headache at this time but does have 

episodes of confusion.  According to the ICU nursing staff she has been 

repeating herself and just does not seem to be fully cognitive of her total 

experience in this recent hospitalization. her sister who is at bedside is also 

noticed significant change in her overall cognitive functioning.  This seems to 

wax and wane and she does oftentimes repeat herself.  There was concern that 

even after extubation the patient did show cognitive decline.  As noted her CAT 

scan of the brain did come back negative.  We are recommending the patient 

undergo a MRI of the brain as well as a routine EEG to further evaluate for 

possibility of anoxic encephalopathy following her complicated procedure.  Case 

was discussed today with Dr. Santiago who agrees with our recommendations.  We 

will proceed with MRI and EEG testing to be done while the patient is still in 

the ICU.  We have discussed the case in detail with the patient as well as her 

sister who is at bedside.  All of their questions were answered.  The patient 

was able to answer most questions appropriately but did have some perseveration 

as well as short-term memory loss.  According to the sister they have noticed 

slight improvement as the day goes on.  Neurology is now been consulted for 

further evaluation and recommendations.





Review of Systems


Constitutional: Denies chills, Denies fever


Eyes: denies blurred vision, denies pain


Ears, nose, mouth and throat: Denies headache, Denies sore throat


Cardiovascular: Denies chest pain, Denies shortness of breath


Respiratory: Denies cough


Gastrointestinal: Denies abdominal pain, Denies diarrhea, Denies nausea, Denies 

vomiting


Genitourinary: Denies dysuria, Denies hematuria


Musculoskeletal: Denies myalgias


Integumentary: Denies pruritus, Denies rash


Neurological: Reports change in mentation, Reports confusion, Reports memory 

loss, Denies numbness, Denies weakness


Psychiatric: Denies anxiety, Denies depression


Endocrine: Denies fatigue, Denies weight change





Past Medical History


Past Medical History: Asthma, Pneumonia, Rheumatoid Arthritis (RA)


Additional Past Medical History / Comment(s): Lupus, RA, pericarditis, chronic 

neck pain, chronic headaches,


History of Any Multi-Drug Resistant Organisms: None Reported


Past Surgical History: Ear Surgery, Tonsillectomy


Additional Past Surgical History / Comment(s): titanium plate neck sx, cosmetic 

sx as child on ears


Past Anesthesia/Blood Transfusion Reactions: No Reported Reaction


Past Psychological History: Anxiety, Depression


Smoking Status: Never smoker


Past Alcohol Use History: Rare


Past Drug Use History: None Reported





- Past Family History


  ** Mother


Family Medical History: COPD, Hypertension, Myocardial Infarction (MI), 

Osteoarthritis (OA), Sleep Apnea/CPAP/BIPAP, Thyroid Disorder





  ** Father


Family Medical History: Cancer


Additional Family Medical History / Comment(s): throat





Medications and Allergies


 Home Medications











 Medication  Instructions  Recorded  Confirmed  Type


 


Albuterol Inhaler [Ventolin Hfa 2 puff INHALATION RT-Q4H PRN 11/03/16 08/09/18 

History





Inhaler]    


 


Albuterol Nebulized [Ventolin 2.5 mg INHALATION RT-Q6H PRN 11/03/16 08/09/18 

History





Nebulized]    


 


Colchicine 0.6 mg PO BID 11/03/16 08/09/18 History


 


Hydroxychloroquine Sulfate 400 mg PO HS 06/28/18 08/09/18 History





[Plaquenil]    


 


Ibuprofen [Motrin] 600 mg PO TID 06/28/18 08/09/18 History


 


Omeprazole 20 mg PO DAILY 08/09/18 08/09/18 History











 Allergies











Allergy/AdvReac Type Severity Reaction Status Date / Time


 


hepatitis B virus vaccine Allergy  Itching/swe Verified 08/09/18 16:12





   lling  


 


latex Allergy  Rash/Hives Verified 08/09/18 16:12


 


duloxetine [From Cymbalta] AdvReac  Hearing Verified 08/09/18 16:12





   Loss  














Physical Examination





- Vital Signs


Vital Signs: 


 Vital Signs











  Temp Pulse Resp BP Pulse Ox


 


 08/11/18 11:00   83  26 H  


 


 08/11/18 10:00   73  19  


 


 08/11/18 09:18   87   


 


 08/11/18 09:00   62  19  115/64  98


 


 08/11/18 08:57   68   


 


 08/11/18 08:00  97.3 F L  68   103/58  97


 


 08/11/18 07:00   73  18  84/56  95


 


 08/11/18 06:00   81  19  115/69  95


 


 08/11/18 05:00   76  20  110/61  95


 


 08/11/18 04:00  98.8 F  70  15  113/55  96


 


 08/11/18 03:51   71   


 


 08/11/18 03:38   82   


 


 08/11/18 03:00   72  16  112/60  95


 


 08/11/18 02:00   78  15  109/60  96


 


 08/11/18 01:00   86  19  121/69  95


 


 08/11/18 00:05   85   


 


 08/11/18 00:00  98.5 F  74  19  110/62  99


 


 08/10/18 23:46   75   


 


 08/10/18 23:00   84  16  114/62  93 L


 


 08/10/18 22:00   84  18  118/66  96


 


 08/10/18 21:14   80  13  119/61  100


 


 08/10/18 21:00   85  19  119/61  98


 


 08/10/18 20:35   76   


 


 08/10/18 20:18   68   


 


 08/10/18 20:00  98.1 F  77  31 H  107/59  99


 


 08/10/18 19:00   78  16  116/64  98


 


 08/10/18 18:00  98.8 F  77  21  113/64  98


 


 08/10/18 17:00   81  17  99/52  94 L


 


 08/10/18 16:09   88   


 


 08/10/18 16:00   72  9 L  123/51  97


 


 08/10/18 15:55   90    97


 


 08/10/18 15:00   76  22   94 L


 


 08/10/18 14:00   81  37 H  113/65  94 L


 


 08/10/18 13:00   83  17  102/54  95








 Intake and Output











 08/10/18 08/11/18 08/11/18





 22:59 06:59 14:59


 


Intake Total 800 800 450


 


Output Total 600 680 0


 


Balance 200 120 450


 


Intake:   


 


   800 450


 


    Sodium Chloride 0.9% 1, 800 800 450





    000 ml @ 100 mls/hr IV .   





    Q10H [facility] Rx#:[id number]   


 


Output:   


 


  Urine 600 680 0


 


Other:   


 


  Voiding Method Bedside Commode Bedside Commode 


 


  # Voids   1


 


  Weight  112.7 kg 112.7 kg














- Constitutional


General appearance: cooperative, obese





- EENT


EENT: PERRL, mucous membranes moist





- Respiratory


Respiratory: lungs clear, normal breath sounds





- Cardiovascular


Cardiovascular: regular rate, normal S1, normal S2


Extremities: no peripheral edema bilaterally





- Gastrointestinal


Gastrointestinal: normoactive bowel sounds





- Integumentary


Integumentary: normal





- Neurologic


Cranial nerve examination: PERRL, EOMI, VFF, face symmetric, tongue midline, 

intact gag reflex, intact corneal reflex, normal palatal elevation


Speech examination: intact


Sensorimotor examination: intact


Motor examination - right side: 4/5: biceps, triceps, wrist flexion, wrist 

extension, , hip flexors, knee extensors, dorsiflexion, toe extension (EHL)

, plantarflexion


Motor examination - left side: 4/5: biceps, triceps, wrist flexion, wrist 

extension, , hip flexors, knee extensors, dorsiflexion, toe extension (EHL)

, plantarflexion


Reflex and gait examination: intact


Reflexes: 1+: ankle, bicep, knee, tricep





- Musculoskeletal


Musculoskeletal: no pain





- Psychiatric


Psychiatric: mood/affect appropriate, cooperative





Results





- Laboratory Findings


CBC and BMP: 


 08/11/18 04:23





 08/11/18 04:23


Abnormal Lab Findings: 


 Abnormal Labs











  08/09/18 08/09/18 08/09/18





  14:08 14:08 14:40


 


WBC  15.9 H  


 


Neutrophils #  13.6 H  


 


ABG pH    7.31 L


 


ABG pCO2    46 H


 


ABG pO2    385 H


 


ABG Total CO2    25 H


 


ABG O2 Saturation    100.0 H


 


Chloride   


 


Glucose   182 H 


 


POC Glucose (mg/dL)   


 


Magnesium   


 


Urine Appearance   


 


Urine Protein   


 


Urine Ketones   


 


Urine Blood   


 


Ur Leukocyte Esterase   


 


Urine RBC   


 


Urine WBC   


 


Urine Mucus   














  08/09/18 08/10/18 08/10/18





  18:03 04:36 04:36


 


WBC   18.3 H 


 


Neutrophils #   16.8 H 


 


ABG pH   


 


ABG pCO2   


 


ABG pO2   


 


ABG Total CO2   


 


ABG O2 Saturation   


 


Chloride   


 


Glucose    193 H


 


POC Glucose (mg/dL)  167 H  


 


Magnesium   


 


Urine Appearance   


 


Urine Protein   


 


Urine Ketones   


 


Urine Blood   


 


Ur Leukocyte Esterase   


 


Urine RBC   


 


Urine WBC   


 


Urine Mucus   














  08/10/18 08/10/18 08/10/18





  05:20 06:20 06:30


 


WBC   


 


Neutrophils #   


 


ABG pH   


 


ABG pCO2   


 


ABG pO2   


 


ABG Total CO2  26 H  


 


ABG O2 Saturation  97.7 H  


 


Chloride   


 


Glucose   


 


POC Glucose (mg/dL)   188 H 


 


Magnesium   


 


Urine Appearance    Cloudy H


 


Urine Protein    1+ H


 


Urine Ketones    Trace H


 


Urine Blood    Large H


 


Ur Leukocyte Esterase    Trace H


 


Urine RBC    >182 H


 


Urine WBC    44 H


 


Urine Mucus    Rare H














  08/10/18 08/10/18 08/10/18





  12:42 18:24 23:07


 


WBC   


 


Neutrophils #   


 


ABG pH   


 


ABG pCO2   


 


ABG pO2   


 


ABG Total CO2   


 


ABG O2 Saturation   


 


Chloride   


 


Glucose   


 


POC Glucose (mg/dL)  141 H  151 H  158 H


 


Magnesium   


 


Urine Appearance   


 


Urine Protein   


 


Urine Ketones   


 


Urine Blood   


 


Ur Leukocyte Esterase   


 


Urine RBC   


 


Urine WBC   


 


Urine Mucus   














  08/11/18 08/11/18 08/11/18





  04:23 04:23 06:31


 


WBC  14.3 H  


 


Neutrophils #  13.0 H  


 


ABG pH   


 


ABG pCO2   


 


ABG pO2   


 


ABG Total CO2   


 


ABG O2 Saturation   


 


Chloride   110 H 


 


Glucose   156 H 


 


POC Glucose (mg/dL)    149 H


 


Magnesium   2.5 H 


 


Urine Appearance   


 


Urine Protein   


 


Urine Ketones   


 


Urine Blood   


 


Ur Leukocyte Esterase   


 


Urine RBC   


 


Urine WBC   


 


Urine Mucus   














Assessment and Plan


(1) Anoxic-ischemic encephalopathy


Current Visit: Yes   Status: Acute   Code(s): G93.1 - ANOXIC BRAIN DAMAGE, NOT 

ELSEWHERE CLASSIFIED; I67.82 - CEREBRAL ISCHEMIA   SNOMED Code(s): 615494607


   





(2) Ventral hernia


Current Visit: Yes   Status: Acute   Code(s): K43.9 - VENTRAL HERNIA WITHOUT 

OBSTRUCTION OR GANGRENE   SNOMED Code(s): 725544185


   





(3) Acute bronchospasm


Current Visit: Yes   Status: Acute   Code(s): J98.01 - ACUTE BRONCHOSPASM   

SNOMED Code(s): 84837851058018


   





(4) Acute memory impairment


Current Visit: Yes   Status: Acute   Code(s): R41.3 - OTHER AMNESIA   SNOMED 

Code(s): 268489660


   


Plan: 





This patient is a 32-year-old right hand white female who was admitted to 

Hospital on 08/09/2018 for elective ventral hernia surgery.  Patient had 

complications with preanesthesia prior to her procedure and showed signs of 

respiratory distress.  She became hypotensive and bradycardic and required 

resuscitation.  She was intubated and subsequently was able to be extubated off 

of the ventilator.  Today she continues to have evidence on clinical 

examination of memory loss and cognitive impairment.  She did undergo a 

computed tomography scan of the brain on 08/09/2018 which was reported to be 

negative.  On neurological examination today in the intensive care unit the 

patient does show signs of mild cognitive impairment.  She is making paraphasic 

errors as well as signs of perseveration.  We have recommended the patient 

undergo an MRI of the brain to rule out hypoxic injury to the brain.  We will 

also obtain a routine EEG for further assessment.  Case was discussed today at 

length with the patient and her sister who is at bedside.  All of their 

questions were answered.  We will continue close neurological follow-up of this 

patient in the intensive care unit.  Depending on her MRI results further 

recommendations will be given.  Overall prognosis at this time remains guarded.

  We will continue close neurological follow-up with this patient in the 

intensive care unit.


Time with Patient: Greater than 30

## 2018-08-11 NOTE — P.PN
Progress Note - Text


Progress Note Date: 08/11/18





The patient is alert awake in her bed.  She denies a significant abdominal 

pain.  She has some vocal hoarseness.  Per the patient's family she has some 

short-term memory loss.  Apparently the patient has not swallow anything.





On exam her vital signs are stable.  Her abdomen is soft.  Incision sites are 

clean dry and intact.





Status post robotic-assisted umbilical hernia repair.  Respiratory failure 

secondary to asthma.  Patient will continue to receive supportive care.  She 

will be discharged home when cleared by medicine..

## 2018-08-11 NOTE — P.PN
Subjective


Progress Note Date: 08/11/18


Principal diagnosis: 


Acute hypoxic respiratory failure


Elective ventral hernia repair; canceled secondary to 1





 08/11/2018, 


the patient remains extubated without having any major respiratory 

difficulties.  Nevertheless the mother some neurologic deficits.  The patient 

is having some memory issues.  She cannot remember events or short-term events.

  At other times she becomes disoriented.  No headaches.  No neck stiffness.  

No focal neurological deficits.  No agitation.  There is episodes of brief 

confusion and for that reason a CAT scan of the brain was done and it was 

negative and a neurology consultation was also requested.  As far the swallowing

, there is improving.  The patient does not have any throat pain.  She was 

having initially some difficulties in swallowing and this gradually improved 

and currently we are advancing her to soft.  She is afebrile.  No respiratory 

difficulties for now.  She is on empiric antibiotic coverage with IV Rocephin.  

She is on Pulmicort Respules.  She is on DuoNeb nebulized treatment around-the-

clock.








Objective





- Vital Signs


Vital signs: 


 Vital Signs











Temp  97.4 F L  08/11/18 12:00


 


Pulse  78   08/11/18 12:30


 


Resp  14   08/11/18 12:00


 


BP  103/85   08/11/18 12:00


 


Pulse Ox  96   08/11/18 12:00








 Intake & Output











 08/10/18 08/11/18 08/11/18





 18:59 06:59 18:59


 


Intake Total 7670.999 0696 450


 


Output Total 925 980 0


 


Balance 447.311 220 450


 


Weight  112.7 kg 112.7 kg


 


Intake:   


 


  IV 1100 1200 450


 


    Sodium Chloride 0.9% 1, 1100 1200 450





    000 ml @ 100 mls/hr IV .   





    Q10H [facility] Rx#:[id number]   


 


  Intake, IV Titration 272.311  





  Amount   


 


    Empty Bag 1 bag @ 10 MCG/ 72.311  





    KG/MIN 6.17 mls/hr IV .   





    [id number] [facility] with Propofol   





    1,000 mg Rx#:[id number]   


 


    Magnesium Sulfate-D5w Pmx 200  





    1 gm In Dextrose/Water 1   





    100ml.bag @ 100 mls/hr   





    IVPB Q1H [facility] Rx#:   





    [id number]   


 


Output:   


 


  Urine 925 980 0


 


Other:   


 


  Voiding Method Bedside Commode Bedside Commode Toilet


 


  # Voids   1














- Exam


Gen. appearance, comfortable likely distress resting comfortably in bed.


Head exam was generally normal. There was no scleral icterus or corneal arcus. 

Mucous membranes were moist.


Neck was supple and without jugular venous distension, thyromegaly, or carotid 

bruits. Carotids were easily palpable bilaterally. There was no adenopathy.


Lungs other records symmetrical bilaterally


Cardiac exam revealed the PMI to be normally situated and sized. The rhythm was 

regular and no extrasystoles were noted during several minutes of auscultation. 

The first and second heart sounds were normal and physiologic splitting of the 

second heart sound was noted. There were no murmurs, rubs, clicks, or gallops.


Abdominal exam revealed normal bowel sounds. The abdomen was soft, non-tender, 

and without masses, organomegaly, or appreciable enlargement of the abdominal 

aorta.


Examination of the extremities revealed easily palpable radial, femoral and 

pedal pulses. There was no cyanosis, clubbing or edema.


Examination of the skin revealed no evidence of significant rashes, suspicious 

appearing nevi or other concerning lesions.


Neurologically the patient is having episodes of confusion and memory loss.  

This is essentially short-term memory loss.  No focal neurological deficits.  

Neurologic exam is nonfocal.








- Labs


CBC & Chem 7: 


 08/11/18 04:23





 08/11/18 04:23


Labs: 


 Abnormal Lab Results - Last 24 Hours (Table)











  08/10/18 08/10/18 08/11/18 Range/Units





  18:24 23:07 04:23 


 


WBC    14.3 H  (3.8-10.6)  k/uL


 


Neutrophils #    13.0 H  (1.3-7.7)  k/uL


 


Chloride     ()  mmol/L


 


Glucose     (74-99)  mg/dL


 


POC Glucose (mg/dL)  151 H  158 H   (75-99)  mg/dL


 


Magnesium     (1.6-2.3)  mg/dL














  08/11/18 08/11/18 Range/Units





  04:23 06:31 


 


WBC    (3.8-10.6)  k/uL


 


Neutrophils #    (1.3-7.7)  k/uL


 


Chloride  110 H   ()  mmol/L


 


Glucose  156 H   (74-99)  mg/dL


 


POC Glucose (mg/dL)   149 H  (75-99)  mg/dL


 


Magnesium  2.5 H   (1.6-2.3)  mg/dL








 Microbiology - Last 24 Hours (Table)











 08/09/18 20:22 Gram Stain - Final





 Sputum Sputum Culture - Final














Assessment and Plan


Assessment: 


1. acute hypoxic respiratory failure.  


- The patient was extubated without any major difficulties and currently she is 

on nasal cannula at 2 L/m.  


- Critical care/pulmonary service is following





2. acute hemodynamic instability secondary to hypoxemia; 


- Resolved; patient's blood pressure remained stable at 103/85


- We will continue to monitor closely





3. obesity





4. ventral hernia and the procedure was canceled due to the events mentioned 

above





5. history of lupus/RA





6. history of pericarditis





7. altered mentation, confusion with possibility of an acute hypoxic 

encephalopathy.


- Neurology is consulted; await further recommendations





8.  DVT prophylaxis





CODE STATUS; full code





Time with Patient: Greater than 30

## 2018-08-12 VITALS — DIASTOLIC BLOOD PRESSURE: 69 MMHG | SYSTOLIC BLOOD PRESSURE: 117 MMHG | TEMPERATURE: 98 F | RESPIRATION RATE: 22 BRPM

## 2018-08-12 VITALS — HEART RATE: 92 BPM

## 2018-08-12 LAB
ANION GAP SERPL CALC-SCNC: 4 MMOL/L
BASOPHILS # BLD AUTO: 0 K/UL (ref 0–0.2)
BASOPHILS NFR BLD AUTO: 0 %
BUN SERPL-SCNC: 21 MG/DL (ref 7–17)
CALCIUM SPEC-MCNC: 9 MG/DL (ref 8.4–10.2)
CHLORIDE SERPL-SCNC: 106 MMOL/L (ref 98–107)
CO2 SERPL-SCNC: 29 MMOL/L (ref 22–30)
EOSINOPHIL # BLD AUTO: 0 K/UL (ref 0–0.7)
EOSINOPHIL NFR BLD AUTO: 0 %
ERYTHROCYTE [DISTWIDTH] IN BLOOD BY AUTOMATED COUNT: 4.63 M/UL (ref 3.8–5.4)
ERYTHROCYTE [DISTWIDTH] IN BLOOD: 14.3 % (ref 11.5–15.5)
GLUCOSE BLD-MCNC: 104 MG/DL (ref 75–99)
GLUCOSE SERPL-MCNC: 120 MG/DL (ref 74–99)
HCT VFR BLD AUTO: 40.5 % (ref 34–46)
HGB BLD-MCNC: 12.9 GM/DL (ref 11.4–16)
LYMPHOCYTES # SPEC AUTO: 2.7 K/UL (ref 1–4.8)
LYMPHOCYTES NFR SPEC AUTO: 22 %
MAGNESIUM SPEC-SCNC: 2.2 MG/DL (ref 1.6–2.3)
MCH RBC QN AUTO: 27.9 PG (ref 25–35)
MCHC RBC AUTO-ENTMCNC: 31.9 G/DL (ref 31–37)
MCV RBC AUTO: 87.4 FL (ref 80–100)
MONOCYTES # BLD AUTO: 0.7 K/UL (ref 0–1)
MONOCYTES NFR BLD AUTO: 5 %
NEUTROPHILS # BLD AUTO: 8.7 K/UL (ref 1.3–7.7)
NEUTROPHILS NFR BLD AUTO: 71 %
PLATELET # BLD AUTO: 291 K/UL (ref 150–450)
POTASSIUM SERPL-SCNC: 4.4 MMOL/L (ref 3.5–5.1)
SODIUM SERPL-SCNC: 139 MMOL/L (ref 137–145)
WBC # BLD AUTO: 12.3 K/UL (ref 3.8–10.6)

## 2018-08-12 RX ADMIN — INSULIN ASPART SCH: 100 INJECTION, SOLUTION INTRAVENOUS; SUBCUTANEOUS at 08:38

## 2018-08-12 RX ADMIN — IPRATROPIUM BROMIDE AND ALBUTEROL SULFATE SCH ML: .5; 3 SOLUTION RESPIRATORY (INHALATION) at 07:31

## 2018-08-12 RX ADMIN — FAMOTIDINE SCH MG: 10 INJECTION, SOLUTION INTRAVENOUS at 08:42

## 2018-08-12 RX ADMIN — CEFTRIAXONE SODIUM SCH MG: 1 INJECTION, POWDER, FOR SOLUTION INTRAMUSCULAR; INTRAVENOUS at 08:41

## 2018-08-12 RX ADMIN — COLCHICINE SCH MG: 0.6 TABLET, FILM COATED ORAL at 08:41

## 2018-08-12 RX ADMIN — IPRATROPIUM BROMIDE AND ALBUTEROL SULFATE SCH ML: .5; 3 SOLUTION RESPIRATORY (INHALATION) at 03:47

## 2018-08-12 RX ADMIN — ENOXAPARIN SODIUM SCH MG: 40 INJECTION SUBCUTANEOUS at 08:41

## 2018-08-12 RX ADMIN — IPRATROPIUM BROMIDE AND ALBUTEROL SULFATE SCH ML: .5; 3 SOLUTION RESPIRATORY (INHALATION) at 11:16

## 2018-08-12 RX ADMIN — BUDESONIDE SCH MG: 1 SUSPENSION RESPIRATORY (INHALATION) at 07:31

## 2018-08-12 RX ADMIN — IPRATROPIUM BROMIDE AND ALBUTEROL SULFATE SCH ML: .5; 3 SOLUTION RESPIRATORY (INHALATION) at 00:17

## 2018-08-12 NOTE — EEG
ELECTROENCEPHALOGRAM REPORT



DATE OF EE2018.



REFERRING PHYSICIAN:

Dr. Zhu.



CONSULTING INTERPRETING PHYSICIAN:

Dr. Himanshu Richardson MD.



ELECTROENCEPHALOGRAPHIC EXAMINATION REPORT:



INDICATION FOR EXAMINATION:

This patient is a 32-year-old female, admitted to hospital for surgical repair of

umbilical hernia.  Patient with complications with the intubation with some degree of

an anoxic/hypoxic ischemia to the brain possibly.  The patient to undergo MRI and EEG

for further evaluation.



AGE:

32.



EEG FINDINGS:

A routine 21 channel awake digital EEG recording was accomplished utilizing the 10-20

international system with bipolar and referential montages.  The background activity in

the most alert resting state consists of a low to medium amplitude, fairly well-

developed well sustained 8 Hz activity over the posterior head regions.  This posterior

rhythm attenuates to eye opening.  There is a small amount of low amplitude 18-20 Hz

beta activity seen maximally over the anterior head regions.  Muscle and movement

artifact was observed on a few occasions during the tracing.



Hyperventilation was not performed.  Photic stimulation at flash frequencies of 2-30 Hz

produced a good symmetrical occipital driving response.  No epileptiform discharges

were seen.



IMPRESSION:

This EEG is within normal limits for the patient's age.  The EEG failed to reveal any

focal, lateralized, or epileptiform abnormalities.  Clinical correlation is

recommended.





MMODL / IJN: [id number] / Job#: [id number]

## 2018-08-12 NOTE — XR
EXAMINATION TYPE: XR chest 1V

 

DATE OF EXAM: 8/12/2018

 

HISTORY: bronchospasm.

 

REFERENCE: Previous study dated 8/11/2018.

 

FINDINGS: There has been a previous cervical fusion.

 

The heart is mildly enlarged. Lungs appear clear. Pleural spaces are clear.

 

IMPRESSION: 

 

MILD CARDIOMEGALY.

## 2018-08-12 NOTE — P.PN
Progress Note - Text


Progress Note Date: 08/12/18





The patient feels well.  She denies any abdominal pain.  She has had no 

shortness of breath.  She apparently has been eating.





On exam her vital signs are stable.  Her abdomen is soft.





Patient will be discharged home after being evaluated by ICU physicians.

## 2018-08-12 NOTE — P.PN
Subjective


Progress Note Date: 08/12/18








32-year-old female patient with a difficult post intubation course.  The 

patient came in to undergo a ventral hernia repair.  The patient was induced by 

anesthesia.  The patient was given a combination of other milligrams of 

lidocaine, 50 mg of rocuronium, 2 mg of Versed and 50 g of fentanyl for 

induction.  Following that, the patient was intubated.  Immediately 

postintubation, and it was very difficult to bag.  There is to the volumes were 

low.  The patient progressively become hypoxic.  There was a drop in the pulse 

ox and there was no and bilateral CO2.  The patient was extremely 

bronchospastic and wheezy.  It was difficult to bag.  The patient was given 

epinephrine IV without much improvement in the airway pressures.  A total of 

100 g of epinephrine was given.  Subsequently the patient became bradycardic.  

Heart rate dropped in the mid 50s.  She was given a dose of atropine 1 mg.  She 

continued to drop her saturations on the day low 40s.  A fiberoptic scope was 

used to confirm due to positioning.  It was difficult to pass the ET tube to 

visualized airways.  The procedure was aborted.  Subsequently, the heart rate 

and the blood pressure stabilized and the tube positioning was confirmed again.

  The shins phosphorus came up in the mid 90s.  She was transferred to 

recovery.  I was asked to evaluate this patient in the recovery room





At the time of arrival at around 4:10 PM, the patient was sedated with Diprivan 

and she was very calm and comfortable.  She had received a total of 8 mg of 

Versed and 2 mg of Ativan.  She was successful the mechanical ventilator.  She 

is currently on assist control mode at the rate of 16, tidal volume of 450, 

FiO2 of 50% and PEEP of 5.  Her blood gases was done and patient showed a pH of 

7.31 with a pCO2 of 86 and pO2 of 385 and this was done and FiO2 of 100%.  Stat 

chest x-ray was ordered and the patient's ET tube was seen in the right 

mainstem bronchus.  The tube was pulled back by 2 cm and currently she is on 22 

cm lip line.  Rest on that equal and bilateral.  Peak airway pressure is 21.  

The patient is having equal and symmetrical breath sounds.  Minimal expiratory 

wheezing is noted.  She is hemodynamically stable.  Heart rate is sinus at the 

rate of 105.  Pulse ox is 95% on above-mentioned vent setting with an FiO2 of 40

%.  Obviously procedure was aborted.  No surgery was done.  A Dueñas catheter 

was inserted and the urine output was in order of 30 mL he had she is also 

receiving IV fluids and currently she is on 65 mL of lactated Ringer.  The 

preop blood work was all within normal and the patient had a normal renal 

function.  Patient is known to have bronchial asthma.  She also has history of 

lupus.  She has had previous history of pericarditis.  She has been maintained 

on O2 scene and Plaquenil on outpatient basis.  She is also Ventolin estimate 

basis.  No reported or established history of obstructive sleep apnea.  Note 

that the patient had a normal and uneventful intubation and the cords were 

seen.  The direct laryngoscopy.  A CAT scan of the chest that was done on 02/01/ 2018 showed no acute abnormalities identified and hiatal hernia.  No 

parenchymal abnormalities.  No mediastinal lymphadenopathy.  The mediastinal 

masses.





On 08/10/2018 I'm seeing this patient for a follow-up.  The patient is 

currently in the intensive care unit.  However efforts to wean the patient off 

the mechanical ventilator failed yesterday.  As the patient was being weaned 

off the Diprivan, she was getting agitated and she never got to a point where 

she was able to follow commands or answer questions.  I suspected that there 

was some residual drug effect disturbing our weaning process.  The patient was 

not following any commands.  She was thrashing.  She was getting agitated.  As 

such, we decided to keep the patient on sedation.  She got moved to the 

intensive care unit.  Overnight she was kept on a mechanical ventilator and 

this morning she is on a combination of Diprivan and fentanyl for sedation.  

Diprivan earlier this morning was at 50 g per KG per minute.  The patient is 

also on no dose of fentanyl drip pH is very calm and comfortable.  She is 

synchronous with the mechanical ventilator and currently she is on assist 

control of 16, tidal volume of 450, FiO2 of 40% and a PEEP of 5.  The blood 

gases from this morning showed a pH of 7.41 with a pCO2 of 40.  PO2 of 91.  The 

peak airway pressure is 27.  As mentioned earlier the patient intubated by 7.5 

ET tube.  No difficulties suctioning her through the orotracheal tube.  Able to 

advanced to suction through the orotracheal tube without any major 

difficulties.  The chest x-ray from today shows no acute abnormalities.  Note 

that the patient overnight was having a low-grade fever with a temperature max 

of 100.9.  UA was abnormal and the patient was started on IV Rocephin.  She is 

hemodynamically stable.  She is producing adequate amount of urine output.  The 

white cell count this morning is at 18.3.  Rest of the blood work and 

electrodes are all within normal limits.  No major bronchospasm and wheezing.  

She is on bronchodilators.  She is on IV Solu-Medrol.  She is on Pulmicort 

Respules.  She is on DVT and GI prophylaxis.  CAT scan of the brain was done 

yesterday and it showed no acute abnormalities.





On 08/11/2018, the patient remains extubated without having any major 

respiratory difficulties.  Nevertheless the mother some neurologic deficits.  

The patient is having some memory issues.  She cannot remember events or short-

term events.  At other times she becomes disoriented.  No headaches.  No neck 

stiffness.  No focal neurological deficits.  No agitation.  There is episodes 

of brief confusion and for that reason a CAT scan of the brain was done and it 

was negative and a neurology consultation was also requested.  As far the 

swallowing, there is improving.  The patient does not have any throat pain.  

She was having initially some difficulties in swallowing and this gradually 

improved and currently we are advancing her to soft.  She is afebrile.  No 

respiratory difficulties for now.  She is on empiric antibiotic coverage with 

IV Rocephin.  She is on Pulmicort Respules.  She is on DuoNeb nebulized 

treatment around-the-clock.





On 08/12/2018, the patient is doing better.  Mental status improved.  Memory is 

improving.  The patient was seen by neurology.  The neuro workup was completed.

  EEG was within normal.  MRI of the brain was within normal limits.  The 

patient is able to eat.  Ambulating.  No respiratory distress.  No other 

significant events overnight.  She is ready for discharge from the pulmonary 

and critical care standpoint.





Objective





- Vital Signs


Vital signs: 


 Vital Signs











Temp  98.0 F   08/12/18 08:00


 


Pulse  92   08/12/18 11:29


 


Resp  22   08/12/18 08:00


 


BP  117/69   08/12/18 08:00


 


Pulse Ox  95   08/12/18 08:00








 Intake & Output











 08/11/18 08/12/18 08/12/18





 18:59 06:59 18:59


 


Intake Total 500 1000 


 


Output Total 0  


 


Balance 500 1000 


 


Weight 112.7 kg 111.4 kg 


 


Intake:   


 


    


 


    Sodium Chloride 0.9% 1, 500  





    000 ml @ 50 mls/hr IV .   





    Q20H [facility] Rx#:[id number]   


 


  Oral  1000 


 


Output:   


 


  Urine 0  


 


Other:   


 


  Voiding Method Toilet Toilet Toilet


 


  # Voids 2 1 1














- Exam








Gen. appearance, comfortable likely distress resting comfortably, ambulating


Head exam was generally normal. There was no scleral icterus or corneal arcus. 

Mucous membranes were moist.


Neck was supple and without jugular venous distension, thyromegaly, or carotid 

bruits. Carotids were easily palpable bilaterally. There was no adenopathy.


Lungs other records symmetrical bilaterally


Cardiac exam revealed the PMI to be normally situated and sized. The rhythm was 

regular and no extrasystoles were noted during several minutes of auscultation. 

The first and second heart sounds were normal and physiologic splitting of the 

second heart sound was noted. There were no murmurs, rubs, clicks, or gallops.


Abdominal exam revealed normal bowel sounds. The abdomen was soft, non-tender, 

and without masses, organomegaly, or appreciable enlargement of the abdominal 

aorta.


Examination of the extremities revealed easily palpable radial, femoral and 

pedal pulses. There was no cyanosis, clubbing or edema.


Examination of the skin revealed no evidence of significant rashes, suspicious 

appearing nevi or other concerning lesions.


Neurologically No focal neurological deficits.  Neurologic exam is nonfocal.





- Labs


CBC & Chem 7: 


 08/12/18 03:34





 08/12/18 03:34


Labs: 


 Abnormal Lab Results - Last 24 Hours (Table)











  08/11/18 08/11/18 08/12/18 Range/Units





  17:18 21:01 03:34 


 


WBC    12.3 H  (3.8-10.6)  k/uL


 


Neutrophils #    8.7 H  (1.3-7.7)  k/uL


 


BUN     (7-17)  mg/dL


 


Glucose     (74-99)  mg/dL


 


POC Glucose (mg/dL)  130 H  129 H   (75-99)  mg/dL














  08/12/18 08/12/18 Range/Units





  03:34 07:59 


 


WBC    (3.8-10.6)  k/uL


 


Neutrophils #    (1.3-7.7)  k/uL


 


BUN  21 H   (7-17)  mg/dL


 


Glucose  120 H   (74-99)  mg/dL


 


POC Glucose (mg/dL)   104 H  (75-99)  mg/dL








 Microbiology - Last 24 Hours (Table)











 08/09/18 20:22 Gram Stain - Final





 Sputum Sputum Culture - Final














Assessment and Plan


Plan: 








Assessment





1 acute hypoxic respiratory failure.  All other details discussed earlier.  

This has recovered completely and the patient is back to normal.  Currently on 

room air.  She is ambulating.





2 acute hemodynamic instability secondary to hypoxemia, recovered.





3 obesity





4 ventral hernia and the procedure was canceled due to the events mentioned 

above





5 history of lupus/RA





6 history of pericarditis





7 altered mentation, confusion with possibility of an acute hypoxic 

encephalopathy.  There is also improving and the neuro workup is negative.





Plan





We'll release out of the ICU.  Outpatient medications were resumed.  Outpatient 

follow-up.  Can discharge home if cleared by neurology.

## 2018-08-12 NOTE — P.PN
Subjective


Progress Note Date: 08/12/18





This patient is a 32 year old female being seen in the ICU for episode of 

anoxic injury following recent surgical procedure.  The patient was evaluated 

yesterday on neurology consultation.  She was having slight memory difficulties 

which seem to be out of place for her age and day-to-day functioning.  Her 

sister who is at bedside yesterday in the ICU also noted cognitive change.  

Patient was sent for MRI of the brain yesterday for further evaluation.  MRI of 

the brain is reported normal with no evidence of anoxic injury.  Patient was 

also able to complete routine EEG today which was reviewed and her EEG is 

normal for age.  No evidence of any epileptiform discharges.  The patient seems 

to be doing better in terms of her cognitive functioning this morning in the 

ICU.  We recommend she should follow-up in the outpatient neurology clinic in 3-

4 weeks for follow-up and to reassess her overall cognitive functioning at that 

time.  We reviewed the results of the MRI and EEG today with the patient in 

detail.  Plans are for possible discharge home today if cleared by pulmonary 

medicine.  We will continue close neurological follow-up for the patient during 

this admission.





Objective





- Vital Signs


Vital signs: 


 Vital Signs











Temp  98.6 F   08/12/18 04:00


 


Pulse  60   08/12/18 07:32


 


Resp  17   08/12/18 04:00


 


BP  113/71   08/12/18 04:00


 


Pulse Ox  96   08/12/18 04:00








 Intake & Output











 08/11/18 08/12/18 08/12/18





 18:59 06:59 18:59


 


Intake Total 500 1000 


 


Output Total 0  


 


Balance 500 1000 


 


Weight 112.7 kg 111.4 kg 


 


Intake:   


 


    


 


    Sodium Chloride 0.9% 1, 500  





    000 ml @ 50 mls/hr IV .   





    Q20H [facility] Rx#:[id number]   


 


  Oral  1000 


 


Output:   


 


  Urine 0  


 


Other:   


 


  Voiding Method Toilet Toilet 


 


  # Voids 2 1 














- Exam





Physical Examination:





PHYSICAL EXAMINATION: Patient is resting comfortably in bed. 


VITAL SIGNS: Blood pressure is [117/69]. Heart rate is [74]. Respiration is [22]

. Temperature is [98.0].


HEENT: Head is atraumatic, neck is supple, there were no carotid bruits.


CHEST: Lungs are clear to auscultation and percussion.  


CARDIAC: S1, S2 normal rate and rhythm. There is no murmur.


ABDOMEN: Soft and nontender. Bowel sounds are present.


EXTREMITIES:  There is no pedal edema.  Peripheral pulses are present.





Neurological examination:





Patient has a nonfocal neurological examination.  She is alert and oriented 3.

  Her remaining neurological examination is nonfocal.





- Labs


CBC & Chem 7: 


 08/12/18 03:34





 08/12/18 03:34


Labs: 


 Abnormal Lab Results - Last 24 Hours (Table)











  08/11/18 08/11/18 08/12/18 Range/Units





  17:18 21:01 03:34 


 


WBC    12.3 H  (3.8-10.6)  k/uL


 


Neutrophils #    8.7 H  (1.3-7.7)  k/uL


 


BUN     (7-17)  mg/dL


 


Glucose     (74-99)  mg/dL


 


POC Glucose (mg/dL)  130 H  129 H   (75-99)  mg/dL














  08/12/18 08/12/18 Range/Units





  03:34 07:59 


 


WBC    (3.8-10.6)  k/uL


 


Neutrophils #    (1.3-7.7)  k/uL


 


BUN  21 H   (7-17)  mg/dL


 


Glucose  120 H   (74-99)  mg/dL


 


POC Glucose (mg/dL)   104 H  (75-99)  mg/dL








 Microbiology - Last 24 Hours (Table)











 08/09/18 20:22 Gram Stain - Final





 Sputum Sputum Culture - Final














Assessment and Plan


(1) Anoxic-ischemic encephalopathy


Current Visit: Yes   Status: Acute   Code(s): G93.1 - ANOXIC BRAIN DAMAGE, NOT 

ELSEWHERE CLASSIFIED; I67.82 - CEREBRAL ISCHEMIA   SNOMED Code(s): 410663175


   





(2) Ventral hernia


Current Visit: Yes   Status: Acute   Code(s): K43.9 - VENTRAL HERNIA WITHOUT 

OBSTRUCTION OR GANGRENE   SNOMED Code(s): 299619785


   





(3) Acute bronchospasm


Current Visit: Yes   Status: Acute   Code(s): J98.01 - ACUTE BRONCHOSPASM   

SNOMED Code(s): 35104833222610


   





(4) Acute memory impairment


Current Visit: Yes   Status: Acute   Code(s): R41.3 - OTHER AMNESIA   SNOMED 

Code(s): 222239412


   


Plan: 





This patient is a 32-year-old female being evaluated for recent episode of 

severe anoxic hypoxic injury following surgical intervention for umbilical 

hernia repair.  Patient showed slight confusion following attempt for surgery.  

She was transferred to the ICU and neurology was consulted yesterday due to 

mental status changes.  She was sent for MRI of the brain yesterday the results 

which are noted above.  MRI is entirely normal with no evidence of any anoxic 

injury to the brain.  She also underwent routine EEG today which was reviewed 

and her EEG is normal for age.  We reviewed the results of the MRI and EEG 

today with the patient in detail.  All of her questions were answered.  She 

does seem to show improvement today in terms of her immediate memory and 

recall.  Hopefully she will continue to show gradual improvement over the next 

several weeks.  She may follow-up in the outpatient neurology clinic in 3-4 

weeks.  She is being considered for discharge home today pending clearance by 

Dr. Santiago.  We will continue close neurological follow-up for the patient 

during this admission.

## 2018-09-27 ENCOUNTER — HOSPITAL ENCOUNTER (OUTPATIENT)
Dept: HOSPITAL 47 - RADCTMAIN | Age: 33
Discharge: HOME | End: 2018-09-27
Attending: OTOLARYNGOLOGY
Payer: MEDICARE

## 2018-09-27 DIAGNOSIS — J38.00: Primary | ICD-10-CM

## 2018-09-27 PROCEDURE — 71046 X-RAY EXAM CHEST 2 VIEWS: CPT

## 2018-09-27 PROCEDURE — 70491 CT SOFT TISSUE NECK W/DYE: CPT

## 2018-09-27 NOTE — CT
EXAMINATION TYPE: CT soft tissue neck w con

 

DATE OF EXAM: 9/27/2018

 

COMPARISON: 11/14/2014

 

HISTORY: Hoarseness and choking after intubation.

 

CT DLP: 703 mGycm

 

CONTRAST: Patient injected with 100ml mL of Isovue M300.

 

TECHNIQUE: Axial images at 3 mm thick sections.  Reconstructed images in the coronal plane and sagitt
al plane are reviewed.

 

FINDINGS: Limited CT sections are obtained the lung apices.  The lung apices appear clear. 

 

CT neck: The torus tubarius and fossa of Rosenmuller are normal.   spaces are normal.  Para
nasal sinuses and mastoid air cells are clear. 

 

Parotid glands appear normal and symmetrical.  Submandibular glands, are normal.  Parapharyngeal spac
es are normal.  No suspicious adenopathy is evident. A few small jugulodigastric nodes are present. S
ome shotty lymphadenopathy is within the posterior triangles of the neck.

 

The hypopharynx appears within normal limits.

Vocal cord level appear symmetrical. The infraglottic trachea appears unremarkable.

Thyroid as visualized is normal.  

Lung apices are clear.

Osseous structures are normal.

 

IMPRESSIONS:

1. Normal soft tissue neck.

## 2018-09-27 NOTE — XR
EXAMINATION TYPE: XR chest 2V

 

DATE OF EXAM: 9/27/2018

 

COMPARISON: 8/12/2018

 

INDICATION: Hoarseness, left vocal cord paralysis

 

TECHNIQUE:  Frontal and lateral views of the chest are obtained.

 

FINDINGS:  

The heart size is normal.  

The pulmonary vasculature is normal.

The lungs are clear.  Aortopulmonic window region appears normal. No discrete masses are evident. Hil
ar regions are normal. Postsurgical changes within the lower cervical disc spacer noted.

 

IMPRESSION:  

1. No acute pulmonary process.

## 2019-03-17 ENCOUNTER — HOSPITAL ENCOUNTER (EMERGENCY)
Dept: HOSPITAL 47 - EC | Age: 34
LOS: 1 days | Discharge: HOME | End: 2019-03-18
Payer: MEDICARE

## 2019-03-17 VITALS — RESPIRATION RATE: 18 BRPM

## 2019-03-17 DIAGNOSIS — Z88.7: ICD-10-CM

## 2019-03-17 DIAGNOSIS — M54.5: Primary | ICD-10-CM

## 2019-03-17 DIAGNOSIS — Z91.040: ICD-10-CM

## 2019-03-17 DIAGNOSIS — J45.909: ICD-10-CM

## 2019-03-17 DIAGNOSIS — Z88.8: ICD-10-CM

## 2019-03-17 PROCEDURE — 81003 URINALYSIS AUTO W/O SCOPE: CPT

## 2019-03-17 PROCEDURE — 99283 EMERGENCY DEPT VISIT LOW MDM: CPT

## 2019-03-17 PROCEDURE — 81025 URINE PREGNANCY TEST: CPT

## 2019-03-18 VITALS — SYSTOLIC BLOOD PRESSURE: 140 MMHG | DIASTOLIC BLOOD PRESSURE: 75 MMHG | HEART RATE: 84 BPM | TEMPERATURE: 98.7 F

## 2019-03-18 LAB
HYALINE CASTS UR QL AUTO: 1 /LPF (ref 0–2)
PH UR: 6 [PH] (ref 5–8)
RBC UR QL: 1 /HPF (ref 0–5)
SP GR UR: 1.01 (ref 1–1.03)
SQUAMOUS UR QL AUTO: 3 /HPF (ref 0–4)
UROBILINOGEN UR QL STRIP: <2 MG/DL (ref ?–2)
WBC #/AREA URNS HPF: 1 /HPF (ref 0–5)

## 2019-03-18 NOTE — ED
Back Pain HPI





- General


Chief Complaint: Back Pain/Injury


Stated Complaint: Back Pain


Time Seen by Provider: 03/17/19 23:00


Source: patient


Limitations: no limitations





- History of Present Illness


Initial Comments: 


33-year-old female patient presents to the emergency department today for 

evaluation of right lower back pain.  Patient describes the pain as a burning 

sensation that occasionally radiates down her right leg.  Patient states this 

started a couple of days ago when she was bending over to try and appear issues.

 States she felt a "popping" sensation in the right low back and has had pain 

since.  States that she was seen and evaluated at [facility] last

evening and was diagnosed with muscle spasm was given a prescription for Valium.

 States she can't take the Valium because it makes her tired and she has 

children at home.  States she has been taking Motrin for her symptoms but it is 

not helping.  She denies any current radiating pain down the leg.  Denies any 

saddle anesthesia or loss of bowel or bladder control.  Denies any numbness or 

tingling to the lower extremities.  Denies any fevers or chills. Patient denies 

any recent rash, shortness breath, chest pain, abdominal pain, nausea, vomiting,

diarrhea, constipation, dizziness, weakness, hematuria, dysuria, urinary 

urgency, urinary frequency, headache, visual changes, or any other complaints.








- Related Data


                                Home Medications











 Medication  Instructions  Recorded  Confirmed


 


Albuterol Inhaler [Ventolin Hfa 2 puff INHALATION RT-Q4H PRN 11/03/16 03/15/19





Inhaler]   


 


Ibuprofen [Motrin] 800 mg PO TID PRN 06/28/18 03/15/19


 


Butalb/Acetaminophen/Caffeine 1 - 2 cap PO Q4HR PRN 03/15/19 03/15/19





[Fioricet -40 mg Capsule]   








                                  Previous Rx's











 Medication  Instructions  Recorded


 


Acetaminophen-Codeine 300-30mg 1 tab PO Q6H PRN #12 tablet 03/18/19





[Tylenol #3]  


 


Lidocaine 5% Patch [Lidoderm] 1 patch TOPICAL DAILY #5 patch 03/18/19











                                    Allergies











Allergy/AdvReac Type Severity Reaction Status Date / Time


 


hepatitis B virus vaccine Allergy  Itching/swe Verified 03/17/19 22:55





   lling  


 


latex Allergy  Rash/Hives Verified 03/17/19 22:55


 


duloxetine [From Cymbalta] AdvReac  Hearing Verified 03/17/19 22:55





   Loss  














Review of Systems


ROS Statement: 


Those systems with pertinent positive or pertinent negative responses have been 

documented in the HPI.





ROS Other: All systems not noted in ROS Statement are negative.





Past Medical History


Past Medical History: Asthma, Pneumonia, Rheumatoid Arthritis (RA)


Additional Past Medical History / Comment(s): Lupus, RA, pericarditis, chronic 

neck pain, chronic headaches,


History of Any Multi-Drug Resistant Organisms: None Reported


Past Surgical History: Ear Surgery, Tonsillectomy


Additional Past Surgical History / Comment(s): titanium plate neck sx, cosmetic 

sx as child on ears


Past Anesthesia/Blood Transfusion Reactions: No Reported Reaction


Past Psychological History: Anxiety, Depression


Smoking Status: Never smoker


Past Alcohol Use History: Occasional


Past Drug Use History: None Reported





- Past Family History


  ** Mother


Family Medical History: COPD, Hypertension, Myocardial Infarction (MI), 

Osteoarthritis (OA), Sleep Apnea/CPAP/BIPAP, Thyroid Disorder





  ** Father


Family Medical History: Cancer


Additional Family Medical History / Comment(s): throat





General Exam


Limitations: no limitations


General appearance: alert, in no apparent distress, other (Social well-

developed, well-nourished adult female patient in no acute distress.  Vital 

signs upon presentation are temperature 99.9F, pulse 88, respirations 18, blood

pressure 140/60, pulse ox 97% on room air.)


Eye exam: Present: normal appearance, PERRL, EOMI.  Absent: scleral icterus, 

conjunctival injection, periorbital swelling


ENT exam: Present: normal exam, normal oropharynx, mucous membranes moist


Respiratory exam: Present: normal lung sounds bilaterally.  Absent: respiratory 

distress, wheezes, rales, rhonchi, stridor


Cardiovascular Exam: Present: regular rate, normal rhythm, normal heart sounds. 

Absent: systolic murmur, diastolic murmur, rubs, gallop, clicks


GI/Abdominal exam: Present: soft, normal bowel sounds.  Absent: distended, 

tenderness, guarding, rebound, rigid


Back exam: Present: normal inspection.  Absent: vertebral tenderness


Neurological exam: Present: alert, oriented X3, CN II-XII intact, other 

(Strength in all 4 extremities is 5/5.)


Psychiatric exam: Present: normal affect, normal mood


Skin exam: Present: warm, dry, intact, normal color.  Absent: rash





Course


                                   Vital Signs











  03/17/19 03/18/19





  22:51 00:47


 


Temperature 99.9 F H 98.7 F


 


Pulse Rate 88 84


 


Respiratory 18 18





Rate  


 


Blood Pressure 148/68 140/75


 


O2 Sat by Pulse 97 97





Oximetry  














Medical Decision Making





- Medical Decision Making


33-year-old female patient presented to the emergency department today for 

evaluation of right lower back pain.  She describes the pain as a burning type 

pain.  Physical examination is unremarkable.  She is neurologically intact with 

no focal deficits.  No concerning symptoms for cauda equina.  Patient did have 

x-rays that were negative at [facility] last evening that were 

negative.  Symptoms are consistent with mechanical low back pain.  Patient does 

have a procedure coming up in a couple of days for bronchoscopy, she is 

instructed not to take anti-inflammatory medications.  She'll be given a 

prescription for Tylenol with Codeine.  She is instructed to apply ice and heat.

 She'll be given a prescription for Lidoderm patch.  Is instructed follow up 

with her primary care physician for recheck in 1-2 days.  Return parameters were

discussed in detail.  She verbalizes understanding and agrees with this plan.








- Lab Data


                                   Lab Results











  03/17/19 03/17/19 Range/Units





  23:50 23:50 


 


Urine Color   Yellow  


 


Urine Appearance   Clear  (Clear)  


 


Urine pH   6.0  (5.0-8.0)  


 


Ur Specific Gravity   1.013  (1.001-1.035)  


 


Urine Protein   Negative  (Negative)  


 


Urine Glucose (UA)   Negative  (Negative)  


 


Urine Ketones   Negative  (Negative)  


 


Urine Blood   Negative  (Negative)  


 


Urine Nitrite   Negative  (Negative)  


 


Urine Bilirubin   Negative  (Negative)  


 


Urine Urobilinogen   <2.0  (<2.0)  mg/dL


 


Ur Leukocyte Esterase   Negative  (Negative)  


 


Urine RBC   1  (0-5)  /hpf


 


Urine WBC   1  (0-5)  /hpf


 


Ur Squamous Epith Cells   3  (0-4)  /hpf


 


Urine Bacteria   Rare H  (None)  /hpf


 


Hyaline Casts   1  (0-2)  /lpf


 


Urine Mucus   Rare H  (None)  /hpf


 


Urine HCG, Qual  Not Detected   (Not Detectd)  














Disposition


Clinical Impression: 


 Acute low back pain





Disposition: HOME SELF-CARE


Condition: Good


Instructions (If sedation given, give patient instructions):  Acute Low Back 

Pain (ED), Lower Back Exercises (ED)


Additional Instructions: 


Use patch as directed, apply for 12 hours remove for 12 hours.  Take pain 

medication sparingly as needed for symptom relief.  Follow-up with your primary 

care physician for recheck in 1-2 days.


Prescriptions: 


Lidocaine 5% Patch [Lidoderm] 1 patch TOPICAL DAILY #5 patch


Acetaminophen-Codeine 300-30mg [Tylenol #3] 1 tab PO Q6H PRN #12 tablet


 PRN Reason: Pain


Is patient prescribed a controlled substance at d/c from ED?: No


Referrals: 


Ronnie Daley MD [Primary Care Provider] - 1-2 days


Time of Disposition: 00:25

## 2019-11-26 NOTE — P.PN
Physical Therapy Treatment     ASSESSMENT:   Patient seen on 3rd floor nursing unit.  Patient presents below baseline which was modified independent with mobility without assistive device; script sent for 2ww order. For safe return to prior living situation the patient needs to be modified independent  for overall mobility. Lives with spouse and kids in two-level house with bedroom on first floor; 2 platform steps to enter. PMH insignificant.     POD: 1  PROCEDURE: R TKA; adductor block  Knee AAROM:  5-85    Today's treatment focused on HEP, bed mobility, transfers, gait 200' with ww.  The patient is demonstrating good progress as evidenced by gait distance, level of assist.  At this time the patient continues to demonstrate impairments in activity tolerance, pain, ROM and strength which is limiting the performance of sit to/from stand transfers and ambulation . Patient is currently functioning at independent  for bed mobility, supervision  for transfers and supervision  for ambulation. Anticipate pt will be ready for d/c from a physical therapy standpoint after pm therapy session.    Further skilled physical therapy services are reasonable and necessary to address the above impairments and performance deficits.            PLAN AND RECOMMENDATIONS:   Objective Measures Impacting Discharge Recommendation:   No objective measures completed this session    Recommendations for Discharge:  Recommendation for Discharge: PT WI: Home, Home therapy      Plan:   Continue skilled PT, including the following Treatment/Interventions: Functional transfer training;Strengthening;Endurance training;ROM;Patient/Family training;Equipment eval/education;Bed mobility;Gait training;Continued evaluation;Stairs retraining;Safety Education;Neuromuscular re-education (11/25/19 2432)      ,   Frequency Comments: BID, ORTHO, (H/HT), DEPT, AE, KA/AP, R TKA 11/25 (11/26/19 7990)     Treatment Plan for Next Session: TKA HEP-add in seated  Progress Note - Text





Patient case cancelled after intubation secondary to bronchospasm. 





Intubation was completed with grade I view, difficult to bag the patient, 

albuterol was given as bronchospasm was suspected.  Tube was also visualized 

going through the cords again via Direct Laryngoscopy.  Patient saturation 

began to drop with no End Tital Co2.  100mcg of epinephrine was given via IV 

without much improvement in pressures.  Patient saturation continued to fall to 

50's despite bagging patient, tracheal obstruction vs bronchospasm suspected, 

ambu bag placed to rule out circuit failure without much improvement.  Patient 

began to james down to 50's and 1mg of atropine was given.  Patient continued 

to drop saturationt o 40's, we again placed a fiberoptic scope to confirm, 

diffiult to pass the scope 2cm past the tube seecondary to possible external 

compression.  Patient HR and BP stabalized and the tube was confirmed again, 

saturation improved to 90's.  





Induction Medications: 100mg lidocaine, 50mg of rocuronium, 50mcg of fentanyl, 

2mg of versed.  NO antibiotics where given. NO latex products were used. 





No signs of anaphylaxis, no skin changes, no drop in BP, no temperature change.

  





After stabilization, patient was taken to the PACU, Vent was set up, ICU was 

consulted and I gave sign out to the ICU doctor and explained my concerns.  CXR 

ordered and verified, I pulled the tube back 2cm but it was above the pawel.  

ABG ordered for Intensivist.  Sedation ordered in PACU, patient to go to ICU 

for monitoring and possible bronchoscopy/possible CT neck to rule out 

compression.  





Family was given an update from Dr. Ambriz. 





We will be available for further evaluation. exercises, bed mobility, transfers, gait with ww, stairs              DIAGNOSIS:   1. Gait instability           PRECAUTIONS:   Precautions  Weight Bearing Status: Weight bearing as tolerated right lower extremity  Other Precautions: s/p R TKA 11/25       EDUCATION:   On this date, the patient was educated on written home exercise program, bed mobility, transfers and ambulation.  The response to education was: Verbalizes understanding, Demonstrates understanding and Needs reinforcement.         Discussed with Patient the need for adequate help at home upon discharge.  Patient currently has sufficient help at their previous living situation.  Please see above for discharge recommendations.     SUBJECTIVE:   Subjective: pt agreeable to therapy (11/26/19 0935)       OBJECTIVE:   Bed Mobility:    Bed Mobility  Supine to Sit: Independent (11/26/19 0935)  Bed Mobility Comments: supv for safety; pt mildly impulsive (11/25/19 1810)     Transfers:    Transfers  Sit to Stand: Supervision (Supv) (11/26/19 0935)  Stand to Sit: Supervision (Supv) (11/26/19 0935)  Stand Pivot Transfers: Touching/Steadying Assistance;Supervision (Supv) (11/25/19 1810)  Assistive Device/: 2-wheeled walker (11/26/19 0935)  Transfer Comments 1: supv-mod indep (11/26/19 0935)  Transfer Comments 2: cueing for hand placement and sequencing (11/25/19 1810)      Gait:    Gait  Gait Assistance: Supervision (Supv) (11/26/19 0935)  Assistive Device/: 2-wheeled walker (11/26/19 0935)  Ambulation Distance (Feet): 200 Feet (11/26/19 0935)  Gait Comments 1: no LOB, reciprocal pattern with cueing (11/26/19 0935)  Gait Comments 2: no significant instability or LOB (11/25/19 1810)       Stairs:          EQUIPMENT   PT/OT Mobility Equipment for Discharge: script sent for 2ww order; distribute prior to d/c (11/25/19 1810)        GOALS:   Short Term Goals to Be Reviewed On: 11/29/19 (11/25/19 1810)  Short Term Goals = Discharge Goals: Yes  (11/25/19 1810)  Goal Agreement: Patient agrees with goals and treatment plan (11/25/19 1810)  Bed Mobility Discharge Goal: modified independent (11/25/19 1810)  Transfer Discharge Goal: modified independent with LRAD (11/25/19 1810)  Ambulation Discharge Goal: modified independent with LRAD; 150' (11/25/19 1810)  Stairs Discharge Goal: modified independent 2 steps for home entry (11/25/19 1810)  Therapeutic Exercise Discharge Goal: I in HEP (11/25/19 1810)  Other Discharge Goal 1: knee AAROM 0-90 degrees (11/25/19 1810)       BILLING INFORMATION:   Total Treatment Time: PT Time Spent: 30 minutes   Timed Treatment Minutes:

## 2020-09-22 ENCOUNTER — HOSPITAL ENCOUNTER (OUTPATIENT)
Dept: HOSPITAL 47 - RADECHMAIN | Age: 35
Discharge: HOME | End: 2020-09-22
Attending: FAMILY MEDICINE
Payer: MEDICARE

## 2020-09-22 DIAGNOSIS — I07.1: Primary | ICD-10-CM

## 2020-09-22 DIAGNOSIS — R07.9: ICD-10-CM

## 2020-09-22 PROCEDURE — 71046 X-RAY EXAM CHEST 2 VIEWS: CPT

## 2020-09-22 PROCEDURE — 93306 TTE W/DOPPLER COMPLETE: CPT

## 2020-09-22 NOTE — ECHOF
Referral Reason:R07.89 Atypical chest pain, R00.2 Palpitations



MEASUREMENTS

--------

HEIGHT: 170.2 cm

WEIGHT: 104.3 kg

BP: 

IVSd:   1.3 cm     (0.6 - 1.1)

LVIDd:   3.7 cm     (3.9 - 5.3)

LVPWd:   1.3 cm     (0.6 - 1.1)

IVSs:   1.5 cm

LVIDs:   2.5 cm

LVPWs:   1.6 cm

RVIDd:   3.1 cm     (< 3.3)

LAESV Index (A-L):   21.66 ml/m

Ao Diam:   2.1 cm     (2.0 - 3.7)

AV Cusp:   1.6 cm     (1.5 - 2.6)

EPSS:   0.4 cm

MV E Dwain:   0.95 m/s

MV DecT:   218 ms

MV A Dwain:   1.00 m/s

MV E/A Ratio:   0.95 

RAP:   5.00 mmHg

RVSP:   28.75 mmHg

MV EF SLOPE:   80.69 mm/s     (70 - 150)

MV EXCURSION:   17.41 mm     (> 18.000)







FINDINGS

--------

This was a technically good study.

The left ventricular size is normal.   There is mild concentric left ventricular hypertrophy.   Overa
ll left ventricular systolic function is normal with, an EF between 55 - 60 %.   The diastolic fillin
g pattern is normal for the age of the patient 11.63.

The right ventricle is normal in size.

Normal LA  size by volume 22+/-6 ml/m2.

The right atrial size is normal.

Interatrial and interventricular septum intact.

The aortic valve is trileaflet and appears structurally normal.   There is no evidence of aortic regu
rgitation.   There is no evidence of aortic stenosis.

There is trace mitral regurgitation.

Mild tricuspid regurgitation present.   There is no evidence of pulmonary hypertension.   The right v
entricular systolic pressure, as measured by Doppler, is 28.75mmHg.

There is no pulmonic regurgitation present.

The aortic root size is normal.

Normal inferior vena cava with normal inspiratory collapse consistent with estimated right atrial pre
ssure of  5 mmHg.

There is no pericardial effusion.



CONCLUSIONS

--------

1. The left ventricular size is normal.

2. There is mild concentric left ventricular hypertrophy.

3. Overall left ventricular systolic function is normal with, an EF between 55 - 60 %.

4. The diastolic filling pattern is normal for the age of the patient 11.63

5. There is trace mitral regurgitation.

6. Mild tricuspid regurgitation present.





SONOGRAPHER: Shaneka Valladares RDCS

## 2020-09-22 NOTE — XR
EXAMINATION TYPE: XR chest 2V

 

DATE OF EXAM: 9/22/2020

 

COMPARISON: 9/27/2018

 

TECHNIQUE: PA and lateral views submitted.

 

HISTORY: Chest pain

 

FINDINGS:

The lungs are clear and  there is no pneumothorax, pleural effusion, or focal pneumonia.  Heart size 
stable. No overt failure.

 

IMPRESSION: 

1. No acute process.

## 2020-11-02 ENCOUNTER — HOSPITAL ENCOUNTER (OUTPATIENT)
Dept: HOSPITAL 47 - LABWHC1 | Age: 35
Discharge: HOME | End: 2020-11-02
Attending: FAMILY MEDICINE
Payer: MEDICARE

## 2020-11-02 DIAGNOSIS — R00.2: ICD-10-CM

## 2020-11-02 DIAGNOSIS — E55.9: ICD-10-CM

## 2020-11-02 DIAGNOSIS — M06.9: ICD-10-CM

## 2020-11-02 DIAGNOSIS — R07.89: Primary | ICD-10-CM

## 2020-11-02 LAB
BASOPHILS # BLD AUTO: 0.1 K/UL (ref 0–0.2)
BASOPHILS NFR BLD AUTO: 1 %
EOSINOPHIL # BLD AUTO: 0.4 K/UL (ref 0–0.7)
EOSINOPHIL NFR BLD AUTO: 4 %
ERYTHROCYTE [DISTWIDTH] IN BLOOD BY AUTOMATED COUNT: 5.07 M/UL (ref 3.8–5.4)
ERYTHROCYTE [DISTWIDTH] IN BLOOD: 13.9 % (ref 11.5–15.5)
HCT VFR BLD AUTO: 44.5 % (ref 34–46)
HGB BLD-MCNC: 14 GM/DL (ref 11.4–16)
LYMPHOCYTES # SPEC AUTO: 2.9 K/UL (ref 1–4.8)
LYMPHOCYTES NFR SPEC AUTO: 29 %
MCH RBC QN AUTO: 27.6 PG (ref 25–35)
MCHC RBC AUTO-ENTMCNC: 31.5 G/DL (ref 31–37)
MCV RBC AUTO: 87.6 FL (ref 80–100)
MONOCYTES # BLD AUTO: 0.4 K/UL (ref 0–1)
MONOCYTES NFR BLD AUTO: 4 %
NEUTROPHILS # BLD AUTO: 6 K/UL (ref 1.3–7.7)
NEUTROPHILS NFR BLD AUTO: 61 %
PLATELET # BLD AUTO: 331 K/UL (ref 150–450)
WBC # BLD AUTO: 9.8 K/UL (ref 3.8–10.6)

## 2020-11-02 PROCEDURE — 84443 ASSAY THYROID STIM HORMONE: CPT

## 2020-11-02 PROCEDURE — 36415 COLL VENOUS BLD VENIPUNCTURE: CPT

## 2020-11-02 PROCEDURE — 80053 COMPREHEN METABOLIC PANEL: CPT

## 2020-11-02 PROCEDURE — 85025 COMPLETE CBC W/AUTO DIFF WBC: CPT

## 2020-11-02 PROCEDURE — 80061 LIPID PANEL: CPT

## 2020-11-02 PROCEDURE — 82306 VITAMIN D 25 HYDROXY: CPT

## 2020-11-03 LAB
ALBUMIN SERPL-MCNC: 4 G/DL (ref 3.8–4.9)
ALBUMIN/GLOB SERPL: 2.11 G/DL (ref 1.6–3.17)
ALP SERPL-CCNC: 70 U/L (ref 41–126)
ALT SERPL-CCNC: 18 U/L (ref 8–44)
ANION GAP SERPL CALC-SCNC: 7.6 MMOL/L (ref 4–12)
AST SERPL-CCNC: 13 U/L (ref 13–35)
BUN SERPL-SCNC: 11 MG/DL (ref 9–27)
BUN/CREAT SERPL: 13.75 RATIO (ref 12–20)
CALCIUM SPEC-MCNC: 8.9 MG/DL (ref 8.7–10.3)
CHLORIDE SERPL-SCNC: 104 MMOL/L (ref 96–109)
CHOLEST SERPL-MCNC: 205 MG/DL (ref 0–200)
CO2 SERPL-SCNC: 28.4 MMOL/L (ref 21.6–31.8)
GLOBULIN SER CALC-MCNC: 1.9 G/DL (ref 1.6–3.3)
GLUCOSE SERPL-MCNC: 89 MG/DL (ref 70–110)
HDLC SERPL-MCNC: 37 MG/DL (ref 40–60)
LDLC SERPL CALC-MCNC: 95 MG/DL (ref 0–131)
POTASSIUM SERPL-SCNC: 4.2 MMOL/L (ref 3.5–5.5)
PROT SERPL-MCNC: 5.9 G/DL (ref 6.2–8.2)
SODIUM SERPL-SCNC: 140 MMOL/L (ref 135–145)
TRIGL SERPL-MCNC: 365 MG/DL (ref 0–149)
VLDLC SERPL CALC-MCNC: 73 MG/DL (ref 5–40)

## 2021-07-07 ENCOUNTER — HOSPITAL ENCOUNTER (EMERGENCY)
Age: 36
Discharge: HOME | End: 2021-07-07
Payer: MEDICARE

## 2021-07-07 PROCEDURE — 86140 C-REACTIVE PROTEIN: CPT

## 2021-07-07 PROCEDURE — 80053 COMPREHEN METABOLIC PANEL: CPT

## 2021-07-07 PROCEDURE — 71046 X-RAY EXAM CHEST 2 VIEWS: CPT

## 2021-07-07 PROCEDURE — 85025 COMPLETE CBC W/AUTO DIFF WBC: CPT

## 2021-07-07 PROCEDURE — 85610 PROTHROMBIN TIME: CPT

## 2021-07-07 PROCEDURE — 96374 THER/PROPH/DIAG INJ IV PUSH: CPT

## 2021-07-07 PROCEDURE — 93005 ELECTROCARDIOGRAM TRACING: CPT

## 2021-07-07 PROCEDURE — 83735 ASSAY OF MAGNESIUM: CPT

## 2021-07-07 PROCEDURE — 84484 ASSAY OF TROPONIN QUANT: CPT

## 2021-07-07 PROCEDURE — 85379 FIBRIN DEGRADATION QUANT: CPT

## 2021-07-07 PROCEDURE — 85730 THROMBOPLASTIN TIME PARTIAL: CPT

## 2021-07-07 PROCEDURE — 83690 ASSAY OF LIPASE: CPT

## 2021-07-07 PROCEDURE — 36415 COLL VENOUS BLD VENIPUNCTURE: CPT

## 2021-07-07 PROCEDURE — 99285 EMERGENCY DEPT VISIT HI MDM: CPT

## 2021-08-27 ENCOUNTER — HOSPITAL ENCOUNTER (EMERGENCY)
Dept: HOSPITAL 47 - EC | Age: 36
Discharge: HOME | End: 2021-08-27
Payer: MEDICARE

## 2021-08-27 VITALS — HEART RATE: 73 BPM | SYSTOLIC BLOOD PRESSURE: 122 MMHG | RESPIRATION RATE: 20 BRPM | DIASTOLIC BLOOD PRESSURE: 76 MMHG

## 2021-08-27 VITALS — TEMPERATURE: 100.1 F

## 2021-08-27 DIAGNOSIS — J45.909: ICD-10-CM

## 2021-08-27 DIAGNOSIS — F41.9: ICD-10-CM

## 2021-08-27 DIAGNOSIS — F12.90: ICD-10-CM

## 2021-08-27 DIAGNOSIS — F32.9: ICD-10-CM

## 2021-08-27 DIAGNOSIS — M06.9: ICD-10-CM

## 2021-08-27 DIAGNOSIS — J12.82: Primary | ICD-10-CM

## 2021-08-27 DIAGNOSIS — Z79.899: ICD-10-CM

## 2021-08-27 DIAGNOSIS — U07.1: ICD-10-CM

## 2021-08-27 LAB
ALBUMIN SERPL-MCNC: 4.1 G/DL (ref 3.5–5)
ALP SERPL-CCNC: 68 U/L (ref 38–126)
ALT SERPL-CCNC: 16 U/L (ref 4–34)
ANION GAP SERPL CALC-SCNC: 8 MMOL/L
APTT BLD: 25.6 SEC (ref 22–30)
AST SERPL-CCNC: 24 U/L (ref 14–36)
BASOPHILS # BLD AUTO: 0 K/UL (ref 0–0.2)
BASOPHILS NFR BLD AUTO: 1 %
BUN SERPL-SCNC: 13 MG/DL (ref 7–17)
CALCIUM SPEC-MCNC: 8.4 MG/DL (ref 8.4–10.2)
CHLORIDE SERPL-SCNC: 103 MMOL/L (ref 98–107)
CO2 SERPL-SCNC: 26 MMOL/L (ref 22–30)
EOSINOPHIL # BLD AUTO: 0 K/UL (ref 0–0.7)
EOSINOPHIL NFR BLD AUTO: 0 %
ERYTHROCYTE [DISTWIDTH] IN BLOOD BY AUTOMATED COUNT: 4.75 M/UL (ref 3.8–5.4)
ERYTHROCYTE [DISTWIDTH] IN BLOOD: 14.5 % (ref 11.5–15.5)
GLUCOSE SERPL-MCNC: 142 MG/DL (ref 74–99)
HCT VFR BLD AUTO: 42 % (ref 34–46)
HGB BLD-MCNC: 13.7 GM/DL (ref 11.4–16)
INR PPP: 1 (ref ?–1.2)
LYMPHOCYTES # SPEC AUTO: 1.2 K/UL (ref 1–4.8)
LYMPHOCYTES NFR SPEC AUTO: 26 %
MCH RBC QN AUTO: 28.9 PG (ref 25–35)
MCHC RBC AUTO-ENTMCNC: 32.7 G/DL (ref 31–37)
MCV RBC AUTO: 88.3 FL (ref 80–100)
MONOCYTES # BLD AUTO: 0.3 K/UL (ref 0–1)
MONOCYTES NFR BLD AUTO: 6 %
NEUTROPHILS # BLD AUTO: 3 K/UL (ref 1.3–7.7)
NEUTROPHILS NFR BLD AUTO: 66 %
PLATELET # BLD AUTO: 240 K/UL (ref 150–450)
POTASSIUM SERPL-SCNC: 3.9 MMOL/L (ref 3.5–5.1)
PROT SERPL-MCNC: 6.6 G/DL (ref 6.3–8.2)
PT BLD: 10.7 SEC (ref 9–12)
SODIUM SERPL-SCNC: 137 MMOL/L (ref 137–145)
WBC # BLD AUTO: 4.5 K/UL (ref 3.8–10.6)

## 2021-08-27 PROCEDURE — 85610 PROTHROMBIN TIME: CPT

## 2021-08-27 PROCEDURE — 83605 ASSAY OF LACTIC ACID: CPT

## 2021-08-27 PROCEDURE — 36415 COLL VENOUS BLD VENIPUNCTURE: CPT

## 2021-08-27 PROCEDURE — 85379 FIBRIN DEGRADATION QUANT: CPT

## 2021-08-27 PROCEDURE — 84484 ASSAY OF TROPONIN QUANT: CPT

## 2021-08-27 PROCEDURE — 99285 EMERGENCY DEPT VISIT HI MDM: CPT

## 2021-08-27 PROCEDURE — 80053 COMPREHEN METABOLIC PANEL: CPT

## 2021-08-27 PROCEDURE — 93005 ELECTROCARDIOGRAM TRACING: CPT

## 2021-08-27 PROCEDURE — 71046 X-RAY EXAM CHEST 2 VIEWS: CPT

## 2021-08-27 PROCEDURE — 87040 BLOOD CULTURE FOR BACTERIA: CPT

## 2021-08-27 PROCEDURE — 85730 THROMBOPLASTIN TIME PARTIAL: CPT

## 2021-08-27 PROCEDURE — 96375 TX/PRO/DX INJ NEW DRUG ADDON: CPT

## 2021-08-27 PROCEDURE — 83880 ASSAY OF NATRIURETIC PEPTIDE: CPT

## 2021-08-27 PROCEDURE — 96361 HYDRATE IV INFUSION ADD-ON: CPT

## 2021-08-27 PROCEDURE — 96367 TX/PROPH/DG ADDL SEQ IV INF: CPT

## 2021-08-27 PROCEDURE — 96365 THER/PROPH/DIAG IV INF INIT: CPT

## 2021-08-27 PROCEDURE — 85025 COMPLETE CBC W/AUTO DIFF WBC: CPT

## 2021-08-27 NOTE — XR
EXAMINATION TYPE: XR chest 2V

 

DATE OF EXAM: 8/27/2021

 

COMPARISON: 7/7/2021

 

HISTORY: 35-year-old female shortness of breath, difficulty breathing, cold and positive

 

TECHNIQUE:  PA and lateral views

 

FINDINGS:  

There is intervertebral disc prosthesis lower cervical spine. Heart normal size. Aorta and pulmonary 
vasculature are within normal limits. No consolidation or pleural effusion is seen. There is mild pat
misti posterior basilar opacity on the lateral view.

 

 

IMPRESSION:  

Some patchy posterior basilar opacity on the lateral view could represent atelectasis or early infilt
rate.

## 2021-08-27 NOTE — ED
SOB HPI





- General


Chief Complaint: Shortness of Breath


Stated Complaint: Covid, Low 02


Time Seen by Provider: 08/27/21 09:04


Source: patient


Mode of arrival: ambulatory


Limitations: no limitations





- History of Present Illness


Initial Comments: 





This 35-year-old female presents with a complaint of shortness of breath which 

is been present for 8 days.  She was diagnosed with a positive COVID test 4 days

ago and 2 days ago.  She has had loss of taste and loss of smell.  She's had 

some moderate fatigue.  She complains of nausea and decreased appetite.  There 

is no leg pain or swelling or history of DVT or PE.  She apparently was seen at 

[place] emergency department 2 days ago and had x-ray and lab work done at that

time and was discharged.  The only medicine she has taken is Tylenol.  She 

states that the shortness of breath is somewhat worse today.  Her oxygenation 

level has been running in the low 90s primarily.  She has had slight fever.  No 

other complaints or modifying factors.





- Related Data


                                Home Medications











 Medication  Instructions  Recorded  Confirmed


 


Acetaminophen Tab [Tylenol Tab] 1,000 mg PO Q6HR PRN 08/27/21 08/27/21


 


Albuterol Inhaler [Ventolin Hfa 2 puff INHALATION RT-Q4H PRN 08/27/21 08/27/21





Inhaler]   


 


Albuterol Nebulized [Ventolin 2.5 mg INHALATION RT-Q4H PRN 08/27/21 08/27/21





Nebulized]   


 


Ascorbic Acid [Vitamin C] 500 mg PO DAILY 08/27/21 08/27/21


 


Elderberry Fruit and Flower [Black 1 cap PO DAILY 08/27/21 08/27/21





Elderberry 575 mg Cap]   


 


Zinc 50 mg PO DAILY 08/27/21 08/27/21








                                  Previous Rx's











 Medication  Instructions  Recorded


 


Azithromycin [Zithromax Z-pack] 0 mg PO AS DIRECTED #6 tab 08/27/21


 


Dexamethasone [Decadron] 6 mg PO DAILY #7 tablet 08/27/21











                                    Allergies











Allergy/AdvReac Type Severity Reaction Status Date / Time


 


adhesive tape Allergy  RASH, Verified 08/27/21 09:45





   BLISTERS  


 


hepatitis B virus vaccine Allergy  Itching/swe Verified 08/27/21 09:45





   lling  


 


latex Allergy  Rash/Hives Verified 08/27/21 09:45


 


duloxetine [From Cymbalta] AdvReac  Hearing Verified 08/27/21 09:45





   Loss  














Review of Systems


ROS Statement: 


Those systems with pertinent positive or pertinent negative responses have been 

documented in the HPI.





ROS Other: All systems not noted in ROS Statement are negative.





Past Medical History


Past Medical History: Asthma, Pneumonia, Rheumatoid Arthritis (RA)


Additional Past Medical History / Comment(s): Lupus, pericarditis, chronic neck 

pain, chronic headaches,


History of Any Multi-Drug Resistant Organisms: None Reported


Past Surgical History: Ear Surgery, Tonsillectomy


Additional Past Surgical History / Comment(s): titanium plate neck sx, cosmetic 

sx as child on ears


Past Anesthesia/Blood Transfusion Reactions: Previous Problems w/ Anesthesia


Additional Past Anesthesia/Blood Transfusion Reaction / Comment(s): " DURING HER

HERNIA SURGERY SCHEDULED AUGUST 2018 THEY WERE UNABLE TO DO HER SURGERY, SHE  

STOPPED BREATHING WAS ADMITTED TO ICU 3 DAYS" . ( SEE PREVIOUS MEDICAL RECORD 

FROM AUG 2019 -BRONCHOSPASM


Past Psychological History: Anxiety, Depression


Smoking Status: Never smoker


Past Alcohol Use History: Occasional


Past Drug Use History: Marijuana





- Past Family History


  ** Mother


Family Medical History: COPD, Hypertension, Myocardial Infarction (MI), 

Osteoarthritis (OA), Sleep Apnea/CPAP/BIPAP, Thyroid Disorder





  ** Father


Family Medical History: Cancer


Additional Family Medical History / Comment(s): throat





General Exam





- General Exam Comments


Initial Comments: 





GENERAL: The patient is well nourished and well hydrated. 


VITAL SIGNS: Heart rate, blood pressure, respiratory rate reviewed as recorded 

in nurse's notes. 


EYES: Pupils are round and reactive. Extraocular movements are intact. No 

conjunctival / lid redness or swelling. 


ENT: No external evidence of injury, swelling, or ecchymosis. Airway is patent. 

Throat is clear. 


NECK: Nontender. No swelling or evidence of injury. No subcutaneous emphysema. T

rachea is midline. No thyroid mass. 


HEART: Regular rate and rhythm. Good peripheral pulses. 


LUNGS/CHEST: Breath sounds clear and equal bilaterally. No rales, rhonchi, or 

wheezes. No ecchymosis, subcutaneous emphysema, or tenderness. 


ABDOMEN: Abdomen soft without tenderness. No palpable masses or organomegaly. No

peritoneal signs. No abdominal wall swelling or ecchymosis. 


EXTREMITIES: No extremity tenderness. Normal muscle tone and function. No 

thoracolumbar tenderness. 


NEUROLOGIC: Sensation is grossly intact. Cranial nerve exam reveals face is 

symmetrical, tongue is midline, speech is clear. 


SKIN: No abrasions or ecchymosis is noted. No induration or masses noted. 


PSYCHIATRIC: Alert and oriented. Appropriate behavior and judgment.





Limitations: no limitations





Course





                                   Vital Signs











  08/27/21 08/27/21 08/27/21





  08:59 09:51 10:02


 


Temperature 100.1 F H  


 


Pulse Rate 95  75


 


Respiratory 20 20 18





Rate   


 


Blood Pressure 116/65  115/65


 


O2 Sat by Pulse 93 L  95





Oximetry   














  08/27/21 08/27/21 08/27/21





  11:00 11:30 12:00


 


Temperature   


 


Pulse Rate 79 84 81


 


Respiratory 18  18





Rate   


 


Blood Pressure 127/75 139/88 118/82


 


O2 Sat by Pulse 95 90 L 94 L





Oximetry   














Medical Decision Making





- Medical Decision Making





The patient was seen and examined.  All diagnostics were reviewed.  EKG shows a 

normal sinus rhythm at a rate of 89.  There is no acute ST-T wave changes noted.

 The GA intervals 146, QRS duration is 88, and the QTC intervals 438.  The 

patient does have a laboratory analysis which does not show any acute sign

ificant abnormalities.  The chest x-ray does show evidence of a posterior 

infiltrate.  It is felt as though this likely is related to her COVID diagnosis.

 She receives Decadron intravenously.  She also receives the Regen-COV antibody 

treatment intravenously.  Wrist benefits of this were discussed with her in 

detail she is agreeable to obtaining it.  Paperwork is completed and this 

regard.  Her recheck pulse ox is approximately 93-94% on room air.  It is felt 

as though she is stable for discharge.  She is agreeable with the following 

disposition and leaves in no distress.  Return parameters were discussed in 

detail.  Close follow-up is recommended.





- Lab Data


Result diagrams: 


                                 08/27/21 09:20





                                 08/27/21 09:20





                                   Lab Results











  08/27/21 08/27/21 08/27/21 Range/Units





  09:20 09:20 09:20 


 


WBC  4.5    (3.8-10.6)  k/uL


 


RBC  4.75    (3.80-5.40)  m/uL


 


Hgb  13.7    (11.4-16.0)  gm/dL


 


Hct  42.0    (34.0-46.0)  %


 


MCV  88.3    (80.0-100.0)  fL


 


MCH  28.9    (25.0-35.0)  pg


 


MCHC  32.7    (31.0-37.0)  g/dL


 


RDW  14.5    (11.5-15.5)  %


 


Plt Count  240    (150-450)  k/uL


 


MPV  7.0    


 


Neutrophils %  66    %


 


Lymphocytes %  26    %


 


Monocytes %  6    %


 


Eosinophils %  0    %


 


Basophils %  1    %


 


Neutrophils #  3.0    (1.3-7.7)  k/uL


 


Lymphocytes #  1.2    (1.0-4.8)  k/uL


 


Monocytes #  0.3    (0-1.0)  k/uL


 


Eosinophils #  0.0    (0-0.7)  k/uL


 


Basophils #  0.0    (0-0.2)  k/uL


 


PT   10.7   (9.0-12.0)  sec


 


INR   1.0   (<1.2)  


 


APTT   25.6   (22.0-30.0)  sec


 


D-Dimer   0.36   (<0.60)  mg/L FEU


 


Sodium    137  (137-145)  mmol/L


 


Potassium    3.9  (3.5-5.1)  mmol/L


 


Chloride    103  ()  mmol/L


 


Carbon Dioxide    26  (22-30)  mmol/L


 


Anion Gap    8  mmol/L


 


BUN    13  (7-17)  mg/dL


 


Creatinine    0.85  (0.52-1.04)  mg/dL


 


Est GFR (CKD-EPI)AfAm    >90  (>60 ml/min/1.73 sqM)  


 


Est GFR (CKD-EPI)NonAf    89  (>60 ml/min/1.73 sqM)  


 


Glucose    142 H  (74-99)  mg/dL


 


Plasma Lactic Acid Tyson     (0.7-2.0)  mmol/L


 


Calcium    8.4  (8.4-10.2)  mg/dL


 


Total Bilirubin    0.3  (0.2-1.3)  mg/dL


 


AST    24  (14-36)  U/L


 


ALT    16  (4-34)  U/L


 


Alkaline Phosphatase    68  ()  U/L


 


Troponin I     (0.000-0.034)  ng/mL


 


NT-Pro-B Natriuret Pep     pg/mL


 


Total Protein    6.6  (6.3-8.2)  g/dL


 


Albumin    4.1  (3.5-5.0)  g/dL














  08/27/21 08/27/21 08/27/21 Range/Units





  09:20 09:20 09:20 


 


WBC     (3.8-10.6)  k/uL


 


RBC     (3.80-5.40)  m/uL


 


Hgb     (11.4-16.0)  gm/dL


 


Hct     (34.0-46.0)  %


 


MCV     (80.0-100.0)  fL


 


MCH     (25.0-35.0)  pg


 


MCHC     (31.0-37.0)  g/dL


 


RDW     (11.5-15.5)  %


 


Plt Count     (150-450)  k/uL


 


MPV     


 


Neutrophils %     %


 


Lymphocytes %     %


 


Monocytes %     %


 


Eosinophils %     %


 


Basophils %     %


 


Neutrophils #     (1.3-7.7)  k/uL


 


Lymphocytes #     (1.0-4.8)  k/uL


 


Monocytes #     (0-1.0)  k/uL


 


Eosinophils #     (0-0.7)  k/uL


 


Basophils #     (0-0.2)  k/uL


 


PT     (9.0-12.0)  sec


 


INR     (<1.2)  


 


APTT     (22.0-30.0)  sec


 


D-Dimer     (<0.60)  mg/L FEU


 


Sodium     (137-145)  mmol/L


 


Potassium     (3.5-5.1)  mmol/L


 


Chloride     ()  mmol/L


 


Carbon Dioxide     (22-30)  mmol/L


 


Anion Gap     mmol/L


 


BUN     (7-17)  mg/dL


 


Creatinine     (0.52-1.04)  mg/dL


 


Est GFR (CKD-EPI)AfAm     (>60 ml/min/1.73 sqM)  


 


Est GFR (CKD-EPI)NonAf     (>60 ml/min/1.73 sqM)  


 


Glucose     (74-99)  mg/dL


 


Plasma Lactic Acid Tyson  0.9    (0.7-2.0)  mmol/L


 


Calcium     (8.4-10.2)  mg/dL


 


Total Bilirubin     (0.2-1.3)  mg/dL


 


AST     (14-36)  U/L


 


ALT     (4-34)  U/L


 


Alkaline Phosphatase     ()  U/L


 


Troponin I   <0.012   (0.000-0.034)  ng/mL


 


NT-Pro-B Natriuret Pep    39  pg/mL


 


Total Protein     (6.3-8.2)  g/dL


 


Albumin     (3.5-5.0)  g/dL














Disposition


Clinical Impression: 


 COVID, Pneumonia due to COVID-19 virus, Nausea, Fatigue, Dyspnea, Hypoxia





Disposition: HOME SELF-CARE


Condition: Good


Instructions (If sedation given, give patient instructions):  Viral Pneumonia 

(ED), Coronavirus Disease 2019 (COVID-19)


Prescriptions: 


Dexamethasone [Decadron] 6 mg PO DAILY #7 tablet


Azithromycin [Zithromax Z-pack] 0 mg PO AS DIRECTED #6 tab


Is patient prescribed a controlled substance at d/c from ED?: No


Referrals: 


Rufus Cheung [Primary Care Provider] - 1-2 days


Time of Disposition: 12:15